# Patient Record
Sex: FEMALE | Race: WHITE | HISPANIC OR LATINO
[De-identification: names, ages, dates, MRNs, and addresses within clinical notes are randomized per-mention and may not be internally consistent; named-entity substitution may affect disease eponyms.]

---

## 2018-12-03 ENCOUNTER — ASOB RESULT (OUTPATIENT)
Age: 33
End: 2018-12-03

## 2018-12-03 ENCOUNTER — APPOINTMENT (OUTPATIENT)
Dept: ANTEPARTUM | Facility: CLINIC | Age: 33
End: 2018-12-03
Payer: SELF-PAY

## 2018-12-03 PROBLEM — Z00.00 ENCOUNTER FOR PREVENTIVE HEALTH EXAMINATION: Status: ACTIVE | Noted: 2018-12-03

## 2018-12-03 PROCEDURE — 76857 US EXAM PELVIC LIMITED: CPT

## 2018-12-03 PROCEDURE — 76830 TRANSVAGINAL US NON-OB: CPT

## 2019-03-30 ENCOUNTER — APPOINTMENT (OUTPATIENT)
Dept: ANTEPARTUM | Facility: CLINIC | Age: 34
End: 2019-03-30

## 2019-04-03 ENCOUNTER — APPOINTMENT (OUTPATIENT)
Age: 34
End: 2019-04-03
Payer: MEDICAID

## 2019-04-03 ENCOUNTER — ASOB RESULT (OUTPATIENT)
Age: 34
End: 2019-04-03

## 2019-04-03 PROCEDURE — 76801 OB US < 14 WKS SINGLE FETUS: CPT

## 2019-05-07 ENCOUNTER — LABORATORY RESULT (OUTPATIENT)
Age: 34
End: 2019-05-07

## 2019-05-08 ENCOUNTER — LABORATORY RESULT (OUTPATIENT)
Age: 34
End: 2019-05-08

## 2019-05-08 ENCOUNTER — APPOINTMENT (OUTPATIENT)
Age: 34
End: 2019-05-08

## 2019-05-08 ENCOUNTER — APPOINTMENT (OUTPATIENT)
Dept: MATERNAL FETAL MEDICINE | Facility: CLINIC | Age: 34
End: 2019-05-08
Payer: MEDICAID

## 2019-05-08 VITALS
HEIGHT: 65 IN | DIASTOLIC BLOOD PRESSURE: 66 MMHG | RESPIRATION RATE: 18 BRPM | WEIGHT: 249.44 LBS | OXYGEN SATURATION: 97 % | BODY MASS INDEX: 41.56 KG/M2 | HEART RATE: 95 BPM | SYSTOLIC BLOOD PRESSURE: 102 MMHG

## 2019-05-08 DIAGNOSIS — Z83.3 FAMILY HISTORY OF DIABETES MELLITUS: ICD-10-CM

## 2019-05-08 DIAGNOSIS — K80.20 CALCULUS OF GALLBLADDER W/OUT CHOLECYSTITIS W/OUT OBSTRUCTION: ICD-10-CM

## 2019-05-08 DIAGNOSIS — Z86.19 PERSONAL HISTORY OF OTHER INFECTIOUS AND PARASITIC DISEASES: ICD-10-CM

## 2019-05-08 DIAGNOSIS — N83.291 OTHER OVARIAN CYST, RIGHT SIDE: ICD-10-CM

## 2019-05-08 PROCEDURE — 99205 OFFICE O/P NEW HI 60 MIN: CPT

## 2019-05-08 RX ORDER — VITAMIN C, CALCIUM, IRON, VITAMIN D3, VITAMIN E, VITAMIN B1, VITAMIN B2, VITAMIN B3, VITAMIN B6, FOLIC ACID, IODINE, ZINC, COPPER, DOCUSATE SODIUM, DOCOSAHEXAENOIC ACID (DHA) 27-1-50 MG
KIT ORAL
Refills: 0 | Status: ACTIVE | COMMUNITY

## 2019-05-08 NOTE — SURGICAL HISTORY
[Abn Paps] : abnormal pap smear [Cysts] : cysts [Last Pap: ___] : Last Pap: [unfilled] [Fibroids] : no fibroids [Breast Disease] : no breast disease [Infertility] : no infertility [STI's] : no STI's [Last Mammo: ___] : Last Mammo: none [OC Use] : no OC use

## 2019-05-08 NOTE — VITALS
[LMP (date): ___] : LMP was on [unfilled] [GA =___ Weeks] : which calculates to a GA of [unfilled] weeks [GA= ___ Days] : and [unfilled] day(s) [DELORES by LMP (date): ___] : The calculated DELORES by LMP is [unfilled] [By LMP] : this is the final DELORES

## 2019-05-08 NOTE — PAST MEDICAL HISTORY
[HIV Infection] : no HIV [Chlamydial Infections] : no chlamydia [Exposure To Gonorrhea] : no gonorrhea [Syphilis] : no syphilis [Human Papilloma Virus Infection] : no genital warts [Hepatitis, B Virus] : no Hepatitis B [Herpes Simplex] : no genital herpes [Trichomoniasis] : no trichomoniasis [Hepatitis, C Virus] : no Hepatitis C

## 2019-05-08 NOTE — FAMILY HISTORY
[Age 35+ During Pregnancy] : not 35 or over during pregnancy [Reported Family History Of Birth Defects] : no congenital heart defects [Alexandre-Sachs Carrier] : no Alexandre-Sachs [Family History] : no mental retardation/autism [Reported Family History Of Genetic Disease] : no history of child defect in child of baby father

## 2019-05-08 NOTE — ACTIVE PROBLEMS
[Heart Disease] : no heart disease [Autoimmune Disease] : no autoimmune disease [Diabetes Mellitus] : no diabetes mellitus [Psychiatric Disorders] : no psychiatric disorders [Neurologic Disorder] : no neurologic disorder, no epilepsy [Renal Disease] : no kidney disease, no UTI [Hepatic Disorder] : no hepatitis, no liver disease [Depression] : no depression, no post partum depression [Thyroid Disorder] : no thyroid dysfunction [Thrombophlebitis] : no varicosities, no phlebitis [Trauma] : no trauma/violence [Blood Transfusion (___ Ml)] : no history of blood transfusion

## 2019-05-08 NOTE — OB HISTORY
[LMP: ___] : LMP: [unfilled] [EGA: ___ wks] : EGA: [unfilled] wks [DELORES: ___] : DELORES: [unfilled] [Spontaneous] : Spontaneous conception [at ___ wks] : at [unfilled] weeks [Sonogram] : sonogram [Definite:  ___ (Date)] : the last menstrual period was [unfilled] [Pregnancy History] : patient did not receive anesthesia [___] : no pregnancy complications reported [FreeTextEntry1] : She began her prenatal care at [facility] and recently changed her OB provider. Her prenatal records were not available for my review.\par \par She told me that she was diagnosed with hypertension approximately one week ago. She was started on labetalol 100 mg twice daily. \par \par She told me that she was diagnosed with anemia during early pregnancy when she was going to the [facility]. She was treated with IV iron therapy.  She is also on oral iron supplementation.\par \par

## 2019-05-08 NOTE — DISCUSSION/SUMMARY
[FreeTextEntry1] : She is 18 weeks and 5 days gestation by her last menstrual period dates.  bpm.\par \par She is overweight and obesity has been associated with a number of maternal complications such as gestational diabetes, pre-eclampsia, thrombophlebitis, labor abnormalities, post-term pregnancies,  delivery, and operative complications. Obesity has been associated with adverse fetal outcomes such as late stillbirth and  deliveries.  Obese women also have a two to three-fold increased incidence in congenital anomalies. \par \par Regarding her chronic hypertension, I told her that she is at risk for developing superimposed pre-eclampsia and fetal growth restriction. She was advised to have laboratory baseline studies such as:  CBC, platelets, PT/PTT, fibrinogen, serum creatinine, a urine protein creatinine ratio, uric acid, LDH, and CMP (liver function tests, BUN, creatinine).  I gave her laboratory referrals to have the recommended tests done as soon as possible. Her blood pressure today was 102/66. She denies having headache, epigastric pain, and visual disturbances. I told her to discontinue the antihypertensive labetalol medication because her blood pressure is  low. I told her that anti-hypertensive medication should be initiated if her systolic blood pressure is equal or greater than 155 or her diastolic blood pressure is equal or greater than 100.   She was advised to perform self blood pressure monitoring and to keep a blood pressure diary. I told her to check her blood pressure 2 or 3 times daily. I also told her to check her blood pressure it she developed a headache. I gave her a prescription for a blood pressure cuff. I also gave her a blood pressure log book page for her to make copies and record her BP readings. She was advised to bring the blood pressure diary to each prenatal visit. She was advised to call your office, or my office, or go to the hospital if her systolic blood pressure is equal or greater than 155 or her diastolic blood pressure is equal or greater than 100.  I recommend maintaining the blood pressures between 120 - 155 systolic / 80 - 100 diastolic.  I recommend daily low dose baby aspirin to decrease the risk for superimposed preeclampsia. I recommend weekly fetal surveillance with BPPs/NSTs starting at 32 - 34 weeks gestation. She can also perform daily fetal movement counts as an adjunct to the NSTs or BPPs.  I recommend serial sonograms with Doppler studies of the umbilical and uterine circulation to screen for fetal growth restriction. She was scheduled for a follow up [facility] visit in approximally 2 weeks. \par \par Regarding her anemia during pregnancy, I advised her to continue taking her prenatal vitamin and the prescribed iron supplementation. She was advised to increase her vitamin C intake and to drink orange juice without calcium because calcium can decrease iron absorption. I told her that red meat, poultry, and fish are dietary sources of iron. I told her that anemia during pregnancy increases the risk of having a  delivery or a low birth weight baby.\par \par Regarding her advanced maternal age, I informed her of the association between advanced maternal age and fetal chromosomal disorders such as Down syndrome and structural birth defects. She was told that all pregnancies have a 2 to 3% risk of having a baby born with a birth defect, and a 1 to 2 % risk of having a baby born with developmental problems that cannot be diagnosed during pregnancy. I told her that there are two types of birth defects, structural and chromosomal. She was made aware that prenatal diagnosis is available to determine whether the fetus she is carrying has normal or abnormal chromosomes,and normal or abnormal anatomy. I discussed the various screening and diagnostic tests used for prenatal diagnosis.  I explained the difference between screening and diagnostic tests.  She was offered diagnostic testing with a genetic amniocentesis.  She was made aware of the small risk of miscarriage associated with the diagnostic procedure, the limitations of the procedure, and the alternative of not having the procedures.  All of her questions were answered. She decided not to have a diagnostic procedure at this time. She decided to have screen testing with a non-invasive prenatal screen (NIPS) test. She was offered a referral for genetic counseling before the NIPS test and agree to have genetic counseling. She has been scheduled to have a detailed fetal anatomy ultrasound examination on May 16, 2019. I gave her a referral to have genetic counseling soon as possible.\par \par I told her that advanced maternal age has been associated with a higher incidence of gestational diabetes, and preeclampsia /eclampsia. I also told her that advanced maternal age has been associated with an increased risk of stillbirth, and therefore, I recommend delivery during the 39 week of gestation in the event she has not given birth by 39 weeks of gestation. \par \par

## 2019-05-09 ENCOUNTER — APPOINTMENT (OUTPATIENT)
Dept: MATERNAL FETAL MEDICINE | Facility: CLINIC | Age: 34
End: 2019-05-09
Payer: MEDICAID

## 2019-05-09 ENCOUNTER — APPOINTMENT (OUTPATIENT)
Dept: ANTEPARTUM | Facility: CLINIC | Age: 34
End: 2019-05-09

## 2019-05-09 ENCOUNTER — LABORATORY RESULT (OUTPATIENT)
Age: 34
End: 2019-05-09

## 2019-05-09 LAB
ALBUMIN SERPL ELPH-MCNC: 4 G/DL
ALP BLD-CCNC: 126 U/L
ALT SERPL-CCNC: 91 U/L
ANION GAP SERPL CALC-SCNC: 19 MMOL/L
AST SERPL-CCNC: 116 U/L
BASOPHILS # BLD AUTO: 0.03 K/UL
BASOPHILS NFR BLD AUTO: 0.2 %
BILIRUB SERPL-MCNC: 0.2 MG/DL
BUN SERPL-MCNC: 7 MG/DL
CALCIUM SERPL-MCNC: 9.7 MG/DL
CHLORIDE SERPL-SCNC: 100 MMOL/L
CO2 SERPL-SCNC: 19 MMOL/L
CREAT SERPL-MCNC: 0.34 MG/DL
CREAT SPEC-SCNC: 146 MG/DL
CREAT/PROT UR: 0.1 RATIO
EOSINOPHIL # BLD AUTO: 0.08 K/UL
EOSINOPHIL NFR BLD AUTO: 0.6 %
FIBRINOGEN PPP COAG.DERIVED-MCNC: 836 MG/DL
GLUCOSE SERPL-MCNC: 120 MG/DL
HCT VFR BLD CALC: 40.3 %
HGB BLD-MCNC: 12.7 G/DL
IMM GRANULOCYTES NFR BLD AUTO: 0.5 %
LDH SERPL-CCNC: 227 U/L
LYMPHOCYTES # BLD AUTO: 1.8 K/UL
LYMPHOCYTES NFR BLD AUTO: 13.8 %
MAN DIFF?: NORMAL
MCHC RBC-ENTMCNC: 27.5 PG
MCHC RBC-ENTMCNC: 31.5 GM/DL
MCV RBC AUTO: 87.2 FL
MONOCYTES # BLD AUTO: 0.75 K/UL
MONOCYTES NFR BLD AUTO: 5.7 %
NEUTROPHILS # BLD AUTO: 10.34 K/UL
NEUTROPHILS NFR BLD AUTO: 79.2 %
PLATELET # BLD AUTO: 389 K/UL
POTASSIUM SERPL-SCNC: 4.3 MMOL/L
PROT SERPL-MCNC: 7 G/DL
PROT UR-MCNC: 19 MG/DL
RBC # BLD: 4.62 M/UL
RBC # FLD: 15.2 %
SODIUM SERPL-SCNC: 138 MMOL/L
URATE SERPL-MCNC: 3.3 MG/DL
WBC # FLD AUTO: 13.06 K/UL

## 2019-05-09 PROCEDURE — 99203 OFFICE O/P NEW LOW 30 MIN: CPT

## 2019-05-13 LAB
2ND TRIMESTER DATA: NORMAL
AFP PNL SERPL: NORMAL
AFP SERPL-ACNC: NORMAL
CLINICAL BIOCHEMIST REVIEW: NORMAL
NOTES NTD: NORMAL

## 2019-05-16 ENCOUNTER — APPOINTMENT (OUTPATIENT)
Dept: ANTEPARTUM | Facility: CLINIC | Age: 34
End: 2019-05-16
Payer: MEDICAID

## 2019-05-16 ENCOUNTER — ASOB RESULT (OUTPATIENT)
Age: 34
End: 2019-05-16

## 2019-05-16 PROCEDURE — 76817 TRANSVAGINAL US OBSTETRIC: CPT

## 2019-05-16 PROCEDURE — 76811 OB US DETAILED SNGL FETUS: CPT

## 2019-05-22 ENCOUNTER — APPOINTMENT (OUTPATIENT)
Dept: MATERNAL FETAL MEDICINE | Facility: CLINIC | Age: 34
End: 2019-05-22
Payer: MEDICAID

## 2019-05-22 ENCOUNTER — APPOINTMENT (OUTPATIENT)
Dept: MATERNAL FETAL MEDICINE | Facility: CLINIC | Age: 34
End: 2019-05-22

## 2019-05-22 ENCOUNTER — APPOINTMENT (OUTPATIENT)
Dept: ANTEPARTUM | Facility: CLINIC | Age: 34
End: 2019-05-22
Payer: MEDICAID

## 2019-05-22 ENCOUNTER — ASOB RESULT (OUTPATIENT)
Age: 34
End: 2019-05-22

## 2019-05-22 VITALS
SYSTOLIC BLOOD PRESSURE: 108 MMHG | HEIGHT: 65 IN | WEIGHT: 250 LBS | HEART RATE: 88 BPM | RESPIRATION RATE: 18 BRPM | OXYGEN SATURATION: 97 % | BODY MASS INDEX: 41.65 KG/M2 | DIASTOLIC BLOOD PRESSURE: 62 MMHG

## 2019-05-22 VITALS
HEART RATE: 88 BPM | HEIGHT: 65 IN | BODY MASS INDEX: 41.65 KG/M2 | WEIGHT: 250 LBS | SYSTOLIC BLOOD PRESSURE: 108 MMHG | DIASTOLIC BLOOD PRESSURE: 62 MMHG

## 2019-05-22 DIAGNOSIS — O99.012 ANEMIA COMPLICATING PREGNANCY, SECOND TRIMESTER: ICD-10-CM

## 2019-05-22 PROCEDURE — 76816 OB US FOLLOW-UP PER FETUS: CPT

## 2019-05-22 PROCEDURE — 99215 OFFICE O/P EST HI 40 MIN: CPT

## 2019-05-22 NOTE — DISCUSSION/SUMMARY
[FreeTextEntry1] : She is 20 weeks and 5 days gestation by her last menstrual period dates.  bpm.\par \par Regarding her anemia during pregnancy, the hemoglobin and hematocrit done on May 8, 2019 were within normal limits (12.7/40.3). She was again advised to continue taking her prenatal vitamin and the prescribed iron supplementation. \par \par Regarding her advanced maternal age, she had a genetic counseling session with Dr. Sheppard on May 9, 2019. She elected to have a maternal serum alpha-fetoprotein level which was reported to be within normal limits; consistent with a low risk for fetal open neural tube defects. She also elected to have in noninvasive prenatal screen test with Microdeletions which reported a low risk for fetal chromosomal abnormalities and Microdeletions. She had fragile X screen testing and was found to be negative for the fragile X expansion mutation by PCR. She also had spinal muscular atrophy screening. She was found to have a reduced risk for being a carrier of spinal muscular atrophy. \par \par Regarding her chronic hypertension, she is no longer taking the labetalol antihypertensive medication. She is taking a daily baby aspirin to decrease her risk of having preeclampsia. I reviewed the laboratory baseline studies I ordered (CBC, platelets, PT/PTT, fibrinogen, serum creatinine, a urine protein creatinine ratio, uric acid, LDH, liver function tests, BUN and creatinine).  The liver function tests were elevated. She denies having liver disease. All the other laboratory tests results were within normal limits for pregnancy. Her blood pressure today was 108/62. She denies having headache, epigastric pain, and visual disturbances.  I told her that anti-hypertensive medication should be initiated if her systolic blood pressure is equal or greater than 150 or her diastolic blood pressure is equal or greater than 100.   She has not been doing self blood pressure monitoring because her medical insurance did not pay for the blood pressure machine. She was advised to call your office, or go to the hospital if developed headache, epigastric pain, or visual disturbances. I recommend repeating the liver function studies.\par \par I recommend weekly fetal surveillance with BPPs/NSTs starting at 32 - 34 weeks gestation. She can also perform daily fetal movement counts as an adjunct to the NSTs or BPPs.  I recommend serial sonograms with Doppler studies of the umbilical and uterine circulation to screen for fetal growth restriction. She was scheduled for a follow up [facility] visit in approximally 8 weeks. \par \par

## 2019-05-22 NOTE — SURGICAL HISTORY
[Abn Paps] : abnormal pap smear [Cysts] : cysts [Last Pap: ___] : Last Pap: [unfilled] [Fibroids] : no fibroids [Breast Disease] : no breast disease [STI's] : no STI's [Infertility] : no infertility [OC Use] : no OC use [Last Mammo: ___] : Last Mammo: none

## 2019-05-22 NOTE — OB HISTORY
[LMP: ___] : LMP: [unfilled] [DELORES: ___] : DELORES: [unfilled] [Spontaneous] : Spontaneous conception [Sonogram] : sonogram [at ___ wks] : at [unfilled] weeks [Definite:  ___ (Date)] : the last menstrual period was [unfilled] [Pregnancy History] : patient did not receive anesthesia [___] : no pregnancy complications reported [EGA: ___ wks] : EGA: [unfilled] wks [FreeTextEntry1] : She began her prenatal care at [facility] and recently changed her OB provider. Her prenatal records were not available for my review.\par \par She told me that she was diagnosed with hypertension approximately one week ago. She was started on labetalol 100 mg twice daily. \par \par She told me that she was diagnosed with anemia during early pregnancy when she was going to the [facility]. She was treated with IV iron therapy.  She is also on oral iron supplementation.\par \par

## 2019-05-22 NOTE — VITALS
[LMP (date): ___] : LMP was on [unfilled] [GA= ___ Days] : and [unfilled] day(s) [DELORES by LMP (date): ___] : The calculated DELORES by LMP is [unfilled] [By LMP] : this is the final DELORES [GA =___ Weeks] : which calculates to a GA of [unfilled] weeks

## 2019-05-22 NOTE — ACTIVE PROBLEMS
[Diabetes Mellitus] : no diabetes mellitus [Heart Disease] : no heart disease [Autoimmune Disease] : no autoimmune disease [Renal Disease] : no kidney disease, no UTI [Neurologic Disorder] : no neurologic disorder, no epilepsy [Psychiatric Disorders] : no psychiatric disorders [Depression] : no depression, no post partum depression [Hepatic Disorder] : no hepatitis, no liver disease [Thrombophlebitis] : no varicosities, no phlebitis [Thyroid Disorder] : no thyroid dysfunction [Trauma] : no trauma/violence [Blood Transfusion (___ Ml)] : no history of blood transfusion

## 2019-06-11 ENCOUNTER — TRANSCRIPTION ENCOUNTER (OUTPATIENT)
Age: 34
End: 2019-06-11

## 2019-06-19 ENCOUNTER — APPOINTMENT (OUTPATIENT)
Dept: MATERNAL FETAL MEDICINE | Facility: CLINIC | Age: 34
End: 2019-06-19
Payer: MEDICAID

## 2019-06-19 ENCOUNTER — APPOINTMENT (OUTPATIENT)
Dept: ANTEPARTUM | Facility: CLINIC | Age: 34
End: 2019-06-19
Payer: MEDICAID

## 2019-06-19 ENCOUNTER — ASOB RESULT (OUTPATIENT)
Age: 34
End: 2019-06-19

## 2019-06-19 VITALS
SYSTOLIC BLOOD PRESSURE: 100 MMHG | BODY MASS INDEX: 42.34 KG/M2 | RESPIRATION RATE: 16 BRPM | WEIGHT: 254.13 LBS | HEIGHT: 65 IN | OXYGEN SATURATION: 98 % | HEART RATE: 95 BPM | DIASTOLIC BLOOD PRESSURE: 60 MMHG

## 2019-06-19 PROCEDURE — 76816 OB US FOLLOW-UP PER FETUS: CPT

## 2019-06-19 PROCEDURE — 99214 OFFICE O/P EST MOD 30 MIN: CPT

## 2019-06-19 RX ORDER — LABETALOL HYDROCHLORIDE 100 MG/1
100 TABLET, FILM COATED ORAL
Refills: 0 | Status: DISCONTINUED | COMMUNITY
End: 2019-06-19

## 2019-06-19 RX ORDER — CHLORHEXIDINE GLUCONATE 4 %
325 (65 FE) LIQUID (ML) TOPICAL
Refills: 0 | Status: ACTIVE | COMMUNITY

## 2019-06-19 NOTE — SURGICAL HISTORY
[Abn Paps] : abnormal pap smear [Last Pap: ___] : Last Pap: [unfilled] [Cysts] : cysts [Breast Disease] : no breast disease [Fibroids] : no fibroids [STI's] : no STI's [Infertility] : no infertility [OC Use] : no OC use [Last Mammo: ___] : Last Mammo: none

## 2019-06-19 NOTE — PAST MEDICAL HISTORY
[HIV Infection] : no HIV [Chlamydial Infections] : no chlamydia [Exposure To Gonorrhea] : no gonorrhea [Human Papilloma Virus Infection] : no genital warts [Syphilis] : no syphilis [Herpes Simplex] : no genital herpes [Hepatitis, B Virus] : no Hepatitis B [Trichomoniasis] : no trichomoniasis [Hepatitis, C Virus] : no Hepatitis C

## 2019-06-19 NOTE — DISCUSSION/SUMMARY
[FreeTextEntry1] : She is 24 weeks and 5 days gestation by her last menstrual period dates. \par \par She is being referred today due to persistently elevated liver function tests. I noted elevated liver function tests on the laboratory tests I ordered on May 9, 2019.  She has been seen a hematologist for anemia during pregnancy. liver function tests done on April 23, 2019 at 16 weeks gestation were also elevated. The ALT was 67 and AST was 91. Repeat liver function test done by the hematologist on Enedelia 3, 2019 (22 weeks) reported the ALT to be 61 and AST to be 108. On May 9, 2019 (19 weeks) the AST was 116 and the ALT was 91. She denies having been diagnosed with liver disease before pregnancy.  Her elevated liver function tests are not due to preeclampsia since her blood pressure has always been within normal limits and all the other baseline laboratory tests done for hypertension were within normal limits.  I believe that she could have autoimmune hepatitis or primary biliary cholangitis. I recommend referring her to a hepatologist for evaluation of elevated liver function tests. I gave her a referral for her to see a hepatologist or GI specialist.\par \par Regarding her chronic hypertension, she is not taking the labetalol antihypertensive medication. She continues to take a daily baby aspirin to decrease her risk of having preeclampsia. Her blood pressure today was 100/60. She denies having headache, epigastric pain, and visual disturbances.  I told her that anti-hypertensive medication should be initiated if her systolic blood pressure is equal or greater than 150 or her diastolic blood pressure is equal or greater than 100.  She has not been doing self blood pressure monitoring because her medical insurance did not pay for the blood pressure machine. She was advised to call your office, or go to the hospital if developed headache, epigastric pain, or visual disturbances.\par \par She had an ultrasound examination done today. The estimated fetal weight was 1 lb. 12 oz. which is at the 66% for gestational age. Amniotic fluid volume was normal. She was schedule to return in 4 weeks for followup ultrasound examination. I recommend weekly fetal surveillance with BPPs/NSTs starting at 32 - 34 weeks gestation. She can also perform daily fetal movement counts as an adjunct to the NSTs or BPPs.  \par

## 2019-06-19 NOTE — OB HISTORY
[LMP: ___] : LMP: [unfilled] [DELORES: ___] : DELORES: [unfilled] [EGA: ___ wks] : EGA: [unfilled] wks [at ___ wks] : at [unfilled] weeks [Spontaneous] : Spontaneous conception [Sonogram] : sonogram [Definite:  ___ (Date)] : the last menstrual period was [unfilled] [Pregnancy History] : patient did not receive anesthesia [FreeTextEntry1] : She began her prenatal care at [facility] and recently changed her OB provider. Her prenatal records were not available for my review.\par \par She told me that she was diagnosed with hypertension approximately one week ago. She was started on labetalol 100 mg twice daily. \par \par She told me that she was diagnosed with anemia during early pregnancy when she was going to the [facility]. She was treated with IV iron therapy.  She is also on oral iron supplementation.\par \par  [___] : no pregnancy complications reported

## 2019-06-19 NOTE — FAMILY HISTORY
[Age 35+ During Pregnancy] : not 35 or over during pregnancy [Reported Family History Of Birth Defects] : no neural tube defect [Alexandre-Sachs Carrier] : no Alexandre-Sachs [Family History] : no mental retardation/autism [Reported Family History Of Genetic Disease] : no history of child defect in child of baby father

## 2019-06-19 NOTE — VITALS
[LMP (date): ___] : LMP was on [unfilled] [GA= ___ Days] : and [unfilled] day(s) [GA =___ Weeks] : which calculates to a GA of [unfilled] weeks [DELORES by LMP (date): ___] : The calculated DELORES by LMP is [unfilled] [By LMP] : this is the final DELORES

## 2019-06-19 NOTE — ACTIVE PROBLEMS
[Diabetes Mellitus] : no diabetes mellitus [Autoimmune Disease] : no autoimmune disease [Renal Disease] : no kidney disease, no UTI [Heart Disease] : no heart disease [Neurologic Disorder] : no neurologic disorder, no epilepsy [Depression] : no depression, no post partum depression [Psychiatric Disorders] : no psychiatric disorders [Hepatic Disorder] : no hepatitis, no liver disease [Thyroid Disorder] : no thyroid dysfunction [Thrombophlebitis] : no varicosities, no phlebitis [Blood Transfusion (___ Ml)] : no history of blood transfusion [Trauma] : no trauma/violence

## 2019-07-17 ENCOUNTER — APPOINTMENT (OUTPATIENT)
Dept: ANTEPARTUM | Facility: CLINIC | Age: 34
End: 2019-07-17
Payer: MEDICAID

## 2019-07-17 ENCOUNTER — ASOB RESULT (OUTPATIENT)
Age: 34
End: 2019-07-17

## 2019-07-17 PROCEDURE — 76816 OB US FOLLOW-UP PER FETUS: CPT

## 2019-07-22 ENCOUNTER — APPOINTMENT (OUTPATIENT)
Dept: HEPATOLOGY | Facility: CLINIC | Age: 34
End: 2019-07-22
Payer: MEDICAID

## 2019-07-22 VITALS
HEART RATE: 104 BPM | RESPIRATION RATE: 16 BRPM | DIASTOLIC BLOOD PRESSURE: 70 MMHG | BODY MASS INDEX: 41.65 KG/M2 | WEIGHT: 250 LBS | TEMPERATURE: 98.1 F | HEIGHT: 65 IN | SYSTOLIC BLOOD PRESSURE: 123 MMHG

## 2019-07-22 DIAGNOSIS — Z34.90 ENCOUNTER FOR SUPERVISION OF NORMAL PREGNANCY, UNSPECIFIED, UNSPECIFIED TRIMESTER: ICD-10-CM

## 2019-07-22 PROCEDURE — 99205 OFFICE O/P NEW HI 60 MIN: CPT

## 2019-07-22 NOTE — ASSESSMENT
[FreeTextEntry1] : Elevated LFTs in the setting of morbid obesity, and 29 weeks of pregnancy. No imaging study available, but suspect underlying fatty liver considering her obesity and diabetes. No evident pre-eclamsia or cholestasis of pregnancy. \par \par PLAN\par - Comprehensive evaluation for elevated LFTs\par - Schedule an US of the abdomen, and Fibroscan\par \par \par RTC in 2 weeks.

## 2019-07-22 NOTE — HISTORY OF PRESENT ILLNESS
[FreeTextEntry1] : 34 year old young female with no significant past medical hx other than morbid obesity, currently 29 weeks pregnancy. She was noted to have elevated LFTs in about 16 weeks ( May 2019). She is being followed by Dr. Jovany Webster ( Ob/Gyn)\par Asymptomatic otherwise.\par Recent diagnosis of Diabetes- not on therapy.\par Patient reports feeling well.\par No edema.\par No known HTN.\par No significant itching (mild on the back)\par \par Only medication is pre- vitamins.\par \par She denies any alcohol or illicit drug use.

## 2019-07-22 NOTE — PHYSICAL EXAM
[General Appearance - Alert] : alert [Extraocular Movements] : extraocular movements were intact [Sclera] : the sclera and conjunctiva were normal [Neck Appearance] : the appearance of the neck was normal [Outer Ear] : the ears and nose were normal in appearance [Heart Rate And Rhythm] : heart rate was normal and rhythm regular [Heart Sounds] : normal S1 and S2 [Heart Sounds Gallop] : no gallops [Murmurs] : no murmurs [Heart Sounds Pericardial Friction Rub] : no pericardial rub [Arterial Pulses Carotid] : carotid pulses were normal with no bruits [Bowel Sounds] : normal bowel sounds [Abnormal Walk] : normal gait [Abdomen Tenderness] : non-tender [] : no rash [Skin Color & Pigmentation] : normal skin color and pigmentation [Cranial Nerves] : cranial nerves 2-12 were intact [Oriented To Time, Place, And Person] : oriented to person, place, and time [Affect] : the affect was normal [Scleral Icterus] : No Scleral Icterus [Spider Angioma] : No spider angioma(s) were observed [FreeTextEntry1] : Obese

## 2019-07-23 ENCOUNTER — ASOB RESULT (OUTPATIENT)
Age: 34
End: 2019-07-23

## 2019-07-23 ENCOUNTER — APPOINTMENT (OUTPATIENT)
Dept: MATERNAL FETAL MEDICINE | Facility: CLINIC | Age: 34
End: 2019-07-23
Payer: MEDICAID

## 2019-07-23 VITALS — WEIGHT: 250.56 LBS | HEIGHT: 65 IN | BODY MASS INDEX: 41.74 KG/M2

## 2019-07-23 DIAGNOSIS — O09.522 SUPERVISION OF ELDERLY MULTIGRAVIDA, SECOND TRIMESTER: ICD-10-CM

## 2019-07-23 DIAGNOSIS — E66.01 MORBID (SEVERE) OBESITY DUE TO EXCESS CALORIES: ICD-10-CM

## 2019-07-23 PROCEDURE — G0108 DIAB MANAGE TRN  PER INDIV: CPT

## 2019-07-23 RX ORDER — URINE ACETONE TEST STRIPS
STRIP MISCELLANEOUS
Qty: 2 | Refills: 2 | Status: ACTIVE | COMMUNITY
Start: 2019-07-23 | End: 1900-01-01

## 2019-07-23 RX ORDER — LANCETS 28 GAUGE
EACH MISCELLANEOUS
Qty: 150 | Refills: 3 | Status: ACTIVE | COMMUNITY
Start: 2019-07-23 | End: 1900-01-01

## 2019-07-23 RX ORDER — BLOOD-GLUCOSE METER
KIT MISCELLANEOUS
Qty: 1 | Refills: 0 | Status: ACTIVE | COMMUNITY
Start: 2019-07-23 | End: 1900-01-01

## 2019-07-24 LAB
ESTIMATED AVERAGE GLUCOSE: 174 MG/DL
HBA1C MFR BLD HPLC: 7.7 %

## 2019-07-25 ENCOUNTER — APPOINTMENT (OUTPATIENT)
Dept: MATERNAL FETAL MEDICINE | Facility: CLINIC | Age: 34
End: 2019-07-25
Payer: MEDICAID

## 2019-07-25 ENCOUNTER — APPOINTMENT (OUTPATIENT)
Dept: ANTEPARTUM | Facility: CLINIC | Age: 34
End: 2019-07-25

## 2019-07-25 VITALS
HEART RATE: 88 BPM | SYSTOLIC BLOOD PRESSURE: 118 MMHG | HEIGHT: 65 IN | WEIGHT: 249 LBS | BODY MASS INDEX: 41.48 KG/M2 | DIASTOLIC BLOOD PRESSURE: 70 MMHG

## 2019-07-25 VITALS
HEART RATE: 88 BPM | WEIGHT: 249 LBS | SYSTOLIC BLOOD PRESSURE: 118 MMHG | HEIGHT: 65 IN | TEMPERATURE: 98.1 F | RESPIRATION RATE: 16 BRPM | BODY MASS INDEX: 41.48 KG/M2 | DIASTOLIC BLOOD PRESSURE: 70 MMHG | OXYGEN SATURATION: 98 %

## 2019-07-25 LAB
A1AT PHENOTYP SERPL-IMP: NORMAL BANDS
A1AT SERPL-MCNC: 277 MG/DL
A1AT SERPL-MCNC: 299 MG/DL
ALBUMIN SERPL ELPH-MCNC: 3.5 G/DL
ALP BLD-CCNC: 166 U/L
ALT SERPL-CCNC: 57 U/L
ANA SER IF-ACNC: NEGATIVE
ANION GAP SERPL CALC-SCNC: 13 MMOL/L
AST SERPL-CCNC: 101 U/L
BASOPHILS # BLD AUTO: 0.02 K/UL
BASOPHILS NFR BLD AUTO: 0.2 %
BILIRUB SERPL-MCNC: 0.2 MG/DL
BUN SERPL-MCNC: 5 MG/DL
CALCIUM SERPL-MCNC: 9.3 MG/DL
CERULOPLASMIN SERPL-MCNC: 66 MG/DL
CHLORIDE SERPL-SCNC: 105 MMOL/L
CO2 SERPL-SCNC: 17 MMOL/L
CREAT SERPL-MCNC: 0.4 MG/DL
EOSINOPHIL # BLD AUTO: 0.09 K/UL
EOSINOPHIL NFR BLD AUTO: 0.8 %
FERRITIN SERPL-MCNC: 52 NG/ML
GLUCOSE SERPL-MCNC: 196 MG/DL
HBV CORE IGG+IGM SER QL: NONREACTIVE
HBV SURFACE AB SER QL: NONREACTIVE
HBV SURFACE AG SER QL: NONREACTIVE
HCT VFR BLD CALC: 36.4 %
HCV AB SER QL: NONREACTIVE
HCV S/CO RATIO: 0.19 S/CO
HEPATITIS A IGG ANTIBODY: REACTIVE
HGB BLD-MCNC: 11.5 G/DL
IMM GRANULOCYTES NFR BLD AUTO: 0.5 %
INR PPP: 1.06 RATIO
IRON SATN MFR SERPL: 12 %
IRON SERPL-MCNC: 55 UG/DL
LYMPHOCYTES # BLD AUTO: 1.59 K/UL
LYMPHOCYTES NFR BLD AUTO: 14.6 %
MAN DIFF?: NORMAL
MCHC RBC-ENTMCNC: 28.4 PG
MCHC RBC-ENTMCNC: 31.6 GM/DL
MCV RBC AUTO: 89.9 FL
MITOCHONDRIA AB SER IF-ACNC: NORMAL
MONOCYTES # BLD AUTO: 0.52 K/UL
MONOCYTES NFR BLD AUTO: 4.8 %
NEUTROPHILS # BLD AUTO: 8.63 K/UL
NEUTROPHILS NFR BLD AUTO: 79.1 %
PLATELET # BLD AUTO: 346 K/UL
POTASSIUM SERPL-SCNC: 4.2 MMOL/L
PROT SERPL-MCNC: 6.5 G/DL
PT BLD: 12.1 SEC
RBC # BLD: 4.05 M/UL
RBC # FLD: 13.3 %
SMOOTH MUSCLE AB SER QL IF: NORMAL
SODIUM SERPL-SCNC: 135 MMOL/L
TIBC SERPL-MCNC: 448 UG/DL
TTG IGG SER IA-ACNC: <1.2 U/ML
TTG IGG SER IA-ACNC: NEGATIVE
UIBC SERPL-MCNC: 393 UG/DL
WBC # FLD AUTO: 10.9 K/UL

## 2019-07-25 PROCEDURE — 99214 OFFICE O/P EST MOD 30 MIN: CPT

## 2019-07-25 NOTE — OB HISTORY
[LMP: ___] : LMP: [unfilled] [DELORES: ___] : DELORES: [unfilled] [Spontaneous] : Spontaneous conception [Sonogram] : sonogram [at ___ wks] : at [unfilled] weeks [Definite:  ___ (Date)] : the last menstrual period was [unfilled] [Pregnancy History] : patient did not receive anesthesia [___] : no pregnancy complications reported [EGA: ___ wks] : EGA: [unfilled] wks [FreeTextEntry1] : She began her prenatal care at [facility] and recently changed her OB provider. Her prenatal records were not available for my review.\par \par She told me that she was diagnosed with hypertension approximately one week ago. She was started on labetalol 100 mg twice daily. \par \par She told me that she was diagnosed with anemia during early pregnancy when she was going to the [facility]. She was treated with IV iron therapy.  She is also on oral iron supplementation.\par \par

## 2019-07-25 NOTE — VITALS
[DELORES by LMP (date): ___] : The calculated DELORES by LMP is [unfilled] [By LMP] : this is the final DELORES [LMP (date): ___] : LMP was on [unfilled] [GA =___ Weeks] : which calculates to a GA of [unfilled] weeks [GA= ___ Days] : and [unfilled] day(s)

## 2019-07-25 NOTE — DISCUSSION/SUMMARY
[FreeTextEntry1] : She is 29 weeks and 6 days gestation by her last menstrual period dates. \par \par She saw the hepatologist on 7/22/19 due to persistently elevated liver function tests. She is scheduled to have an ultrasound examination of her abdomen on July 29, 2019. She had various laboratory tests ordered to help determine the etiology of the elevated liver function tests.\par \par Regarding her chronic hypertension, she is not taking antihypertensive medication. She continues to take a daily baby aspirin to decrease her risk of having preeclampsia. Her blood pressure today was 118/70. She denies having headache, epigastric pain, and visual disturbances.  I again told her that anti-hypertensive medication should be initiated if her systolic blood pressure is equal or greater than 150 or her diastolic blood pressure is equal or greater than 100.  She has not been doing self blood pressure monitoring because her medical insurance did not pay for the blood pressure machine. She was advised to call your office, or go to the hospital if developed headache, epigastric pain, or visual disturbances. She was scheduled for a followup [facility] visit and an ultrasound examination in 3 weeks.\par \par She was recently diagnosed with gestational diabetes. She was seen by me and our diabetes educator on July 23, 2019 for initial diabetes education and counseling.  Hemoglobin A1c level done that day was 7.7%. It is possible that she had diabetes before pregnancy that was not diagnosed until she became pregnant. She was started on 15 units of NPH insulin at bedtime. Her fasting glucose today was 127. She was advised to increase her NPH insulin to 18 units. I also told her that if her fasting glucose remained above 90 on 2 to 3 consecutive days she should increase her bedtime NPH insulin by 2 units. She told met that she understood that she should increase the NPH insulin by 2 unite every 2 to 3 days if the fasting glucose readings remained above 90. She is having difficulties following the recommended diabetic diet. Today, she had additional nutritional counseling. She was advised to return in one week to see the diabetes educator.

## 2019-07-26 LAB — SOLUBLE LIVER IGG SER IA-ACNC: < 20.1 UNITS

## 2019-07-31 ENCOUNTER — APPOINTMENT (OUTPATIENT)
Dept: HEPATOLOGY | Facility: CLINIC | Age: 34
End: 2019-07-31
Payer: MEDICAID

## 2019-07-31 DIAGNOSIS — R94.5 ABNORMAL RESULTS OF LIVER FUNCTION STUDIES: ICD-10-CM

## 2019-07-31 PROCEDURE — 91200 LIVER ELASTOGRAPHY: CPT

## 2019-08-01 LAB
LYSOSOMAL ACID LIPASE INTERPRETATION: NORMAL
LYSOSOMAL ACID LIPASE: 162 CD:384473389

## 2019-08-05 ENCOUNTER — ASOB RESULT (OUTPATIENT)
Age: 34
End: 2019-08-05

## 2019-08-05 ENCOUNTER — APPOINTMENT (OUTPATIENT)
Dept: MATERNAL FETAL MEDICINE | Facility: CLINIC | Age: 34
End: 2019-08-05
Payer: MEDICAID

## 2019-08-05 VITALS — HEIGHT: 65 IN | WEIGHT: 255 LBS | BODY MASS INDEX: 42.49 KG/M2

## 2019-08-05 PROCEDURE — G0108 DIAB MANAGE TRN  PER INDIV: CPT

## 2019-08-05 RX ORDER — BLOOD SUGAR DIAGNOSTIC
STRIP MISCELLANEOUS
Qty: 175 | Refills: 3 | Status: ACTIVE | COMMUNITY
Start: 2019-07-23 | End: 1900-01-01

## 2019-08-12 ENCOUNTER — APPOINTMENT (OUTPATIENT)
Dept: ANTEPARTUM | Facility: CLINIC | Age: 34
End: 2019-08-12

## 2019-08-12 ENCOUNTER — APPOINTMENT (OUTPATIENT)
Dept: MATERNAL FETAL MEDICINE | Facility: CLINIC | Age: 34
End: 2019-08-12
Payer: MEDICAID

## 2019-08-12 ENCOUNTER — ASOB RESULT (OUTPATIENT)
Age: 34
End: 2019-08-12

## 2019-08-12 VITALS — WEIGHT: 255.25 LBS | HEIGHT: 65 IN | BODY MASS INDEX: 42.53 KG/M2

## 2019-08-12 PROCEDURE — XXXXX: CPT

## 2019-08-15 PROBLEM — R94.5 ELEVATED LFTS: Status: ACTIVE | Noted: 2019-06-19

## 2019-08-19 ENCOUNTER — APPOINTMENT (OUTPATIENT)
Dept: ANTEPARTUM | Facility: CLINIC | Age: 34
End: 2019-08-19
Payer: MEDICAID

## 2019-08-19 ENCOUNTER — ASOB RESULT (OUTPATIENT)
Age: 34
End: 2019-08-19

## 2019-08-19 ENCOUNTER — APPOINTMENT (OUTPATIENT)
Dept: MATERNAL FETAL MEDICINE | Facility: CLINIC | Age: 34
End: 2019-08-19
Payer: MEDICAID

## 2019-08-19 VITALS
RESPIRATION RATE: 18 BRPM | OXYGEN SATURATION: 98 % | HEART RATE: 94 BPM | DIASTOLIC BLOOD PRESSURE: 78 MMHG | BODY MASS INDEX: 42.19 KG/M2 | HEIGHT: 65 IN | WEIGHT: 253.25 LBS | SYSTOLIC BLOOD PRESSURE: 130 MMHG

## 2019-08-19 DIAGNOSIS — O24.414 GESTATIONAL DIABETES MELLITUS IN PREGNANCY, INSULIN CONTROLLED: ICD-10-CM

## 2019-08-19 DIAGNOSIS — Z3A.33 33 WEEKS GESTATION OF PREGNANCY: ICD-10-CM

## 2019-08-19 DIAGNOSIS — O99.213 OBESITY COMPLICATING PREGNANCY, THIRD TRIMESTER: ICD-10-CM

## 2019-08-19 DIAGNOSIS — O10.919 UNSPECIFIED PRE-EXISTING HYPERTENSION COMPLICATING PREGNANCY, UNSPECIFIED TRIMESTER: ICD-10-CM

## 2019-08-19 DIAGNOSIS — Z91.19 PATIENT'S NONCOMPLIANCE WITH OTHER MEDICAL TREATMENT AND REGIMEN: ICD-10-CM

## 2019-08-19 PROCEDURE — 76820 UMBILICAL ARTERY ECHO: CPT

## 2019-08-19 PROCEDURE — 76816 OB US FOLLOW-UP PER FETUS: CPT

## 2019-08-19 PROCEDURE — 76819 FETAL BIOPHYS PROFIL W/O NST: CPT

## 2019-08-19 PROCEDURE — 99214 OFFICE O/P EST MOD 30 MIN: CPT

## 2019-08-19 PROCEDURE — 93976 VASCULAR STUDY: CPT

## 2019-08-19 RX ORDER — FERROUS SULFATE 325(65) MG
325 (65 FE) TABLET ORAL
Refills: 0 | Status: ACTIVE | COMMUNITY

## 2019-08-19 RX ORDER — INSULIN LISPRO 100 [IU]/ML
100 INJECTION, SOLUTION INTRAVENOUS; SUBCUTANEOUS
Qty: 1 | Refills: 1 | Status: DISCONTINUED | COMMUNITY
Start: 2019-08-05 | End: 2019-08-19

## 2019-08-19 NOTE — PAST MEDICAL HISTORY
[HIV Infection] : no HIV [Chlamydial Infections] : no chlamydia [Exposure To Gonorrhea] : no gonorrhea [Syphilis] : no syphilis [Herpes Simplex] : no genital herpes [Hepatitis, C Virus] : no Hepatitis C [Human Papilloma Virus Infection] : no genital warts [Hepatitis, B Virus] : no Hepatitis B [Trichomoniasis] : no trichomoniasis

## 2019-08-19 NOTE — DISCUSSION/SUMMARY
[FreeTextEntry1] : She is 33 weeks and 3 days gestation by her last menstrual period dates. \par \par She saw the hepatologist on 7/22/19 due to persistently elevated liver function tests. She states that she was told that she could not have the recommended ultrasound examination of her abdomen because she was pregnant.  She has not had a follow up visit with the hepatologist. \par \par Regarding her chronic hypertension, she is not taking antihypertensive medication. She continues to take a daily baby aspirin to decrease her risk of having preeclampsia. Her blood pressure today was 130/78. She denies having headache, epigastric pain, and visual disturbances.  I again told her that anti-hypertensive medication should be initiated if her systolic blood pressure is equal or greater than 150 or her diastolic blood pressure is equal or greater than 100.  She has not been doing self blood pressure monitoring because her medical insurance did not pay for the blood pressure machine. She was advised to call your office, or go to the hospital if developed headache, epigastric pain, or visual disturbances. She was scheduled for a followup [employer] visit and an ultrasound examination in 4 weeks.\par \par She has gestational diabetes. She was seen by me and our diabetes educator on July 23, 2019 for initial diabetes education and counseling.  Hemoglobin A1c level done 7/23/19 was 7.7%. She currently takes 36 units of NPH insulin at bedtime. Her fasting glucose have been elevated. She was advised to increase her NPH insulin to 40 units. I also told her that if her fasting glucose remained above 90 on 2 to 3 consecutive days she should increase her bedtime NPH insulin by 2 units. She told me that she understood that she should increase the NPH insulin by 2 unite every 2 to 3 days if the fasting glucose readings remained above 90. She has been taking 40 units of NPH insulin before breakfast and 10 units of Admelog  insulin before dinner. Most postprandial glucose values have been elevated. She has nor been testing her glucose at the appropriate times. She states that she usually goes out after eating and cannot test the glucose. She is also not following the recommended diabetic diet. She was advised to return in one week to see the diabetes educator. I adjusted all her insulin dosages and added 10 units of Admelog insulin before breakfast. Her current insulin dosage is 44 units NPH and 10 units at block before breakfast, 16 units at block before dinner and 40 units of NPH before bedtime.\par

## 2019-08-19 NOTE — VITALS
[LMP (date): ___] : LMP was on [unfilled] [DELORES by LMP (date): ___] : The calculated DELORES by LMP is [unfilled] [By LMP] : this is the final DELORES [GA =___ Weeks] : which calculates to a GA of [unfilled] weeks [GA= ___ Days] : and [unfilled] day(s)

## 2019-08-19 NOTE — OB HISTORY
[DELORES: ___] : DELORES: [unfilled] [LMP: ___] : LMP: [unfilled] [Sonogram] : sonogram [Spontaneous] : Spontaneous conception [at ___ wks] : at [unfilled] weeks [Definite:  ___ (Date)] : the last menstrual period was [unfilled] [EGA: ___ wks] : EGA: [unfilled] wks [Pregnancy History] : patient did not receive anesthesia [___] : no pregnancy complications reported [FreeTextEntry1] : She began her prenatal care at [facility] and recently changed her OB provider. Her prenatal records were not available for my review.\par \par She told me that she was diagnosed with hypertension approximately one week ago. She was started on labetalol 100 mg twice daily. \par \par She told me that she was diagnosed with anemia during early pregnancy when she was going to the [facility]. She was treated with IV iron therapy.  She is also on oral iron supplementation.\par \par

## 2019-08-19 NOTE — ACTIVE PROBLEMS
[Diabetes Mellitus] : no diabetes mellitus [Heart Disease] : no heart disease [Renal Disease] : no kidney disease, no UTI [Autoimmune Disease] : no autoimmune disease [Neurologic Disorder] : no neurologic disorder, no epilepsy [Hepatic Disorder] : no hepatitis, no liver disease [Depression] : no depression, no post partum depression [Psychiatric Disorders] : no psychiatric disorders [Thrombophlebitis] : no varicosities, no phlebitis [Trauma] : no trauma/violence [Thyroid Disorder] : no thyroid dysfunction [Blood Transfusion (___ Ml)] : no history of blood transfusion

## 2019-08-20 ENCOUNTER — APPOINTMENT (OUTPATIENT)
Dept: MATERNAL FETAL MEDICINE | Facility: CLINIC | Age: 34
End: 2019-08-20

## 2019-08-23 ENCOUNTER — OUTPATIENT (OUTPATIENT)
Dept: OUTPATIENT SERVICES | Facility: HOSPITAL | Age: 34
LOS: 1 days | End: 2019-08-23
Payer: MEDICAID

## 2019-08-23 VITALS — HEART RATE: 87 BPM | DIASTOLIC BLOOD PRESSURE: 86 MMHG | SYSTOLIC BLOOD PRESSURE: 135 MMHG

## 2019-08-23 VITALS — HEART RATE: 85 BPM | SYSTOLIC BLOOD PRESSURE: 130 MMHG | DIASTOLIC BLOOD PRESSURE: 82 MMHG

## 2019-08-23 DIAGNOSIS — O03.9 COMPLETE OR UNSPECIFIED SPONTANEOUS ABORTION WITHOUT COMPLICATION: Chronic | ICD-10-CM

## 2019-08-23 DIAGNOSIS — Z90.49 ACQUIRED ABSENCE OF OTHER SPECIFIED PARTS OF DIGESTIVE TRACT: Chronic | ICD-10-CM

## 2019-08-23 DIAGNOSIS — O47.03 FALSE LABOR BEFORE 37 COMPLETED WEEKS OF GESTATION, THIRD TRIMESTER: ICD-10-CM

## 2019-08-23 DIAGNOSIS — Z98.890 OTHER SPECIFIED POSTPROCEDURAL STATES: Chronic | ICD-10-CM

## 2019-08-23 LAB
ALBUMIN SERPL ELPH-MCNC: 3.3 G/DL — SIGNIFICANT CHANGE UP (ref 3.3–5.2)
ALP SERPL-CCNC: 283 U/L — HIGH (ref 40–120)
ALT FLD-CCNC: 40 U/L — HIGH
ANION GAP SERPL CALC-SCNC: 14 MMOL/L — SIGNIFICANT CHANGE UP (ref 5–17)
APPEARANCE UR: CLEAR — SIGNIFICANT CHANGE UP
AST SERPL-CCNC: 59 U/L — HIGH
BASOPHILS # BLD AUTO: 0.02 K/UL — SIGNIFICANT CHANGE UP (ref 0–0.2)
BASOPHILS NFR BLD AUTO: 0.2 % — SIGNIFICANT CHANGE UP (ref 0–2)
BILIRUB SERPL-MCNC: 0.2 MG/DL — LOW (ref 0.4–2)
BILIRUB UR-MCNC: NEGATIVE — SIGNIFICANT CHANGE UP
BUN SERPL-MCNC: 6 MG/DL — LOW (ref 8–20)
CALCIUM SERPL-MCNC: 9.4 MG/DL — SIGNIFICANT CHANGE UP (ref 8.6–10.2)
CHLORIDE SERPL-SCNC: 102 MMOL/L — SIGNIFICANT CHANGE UP (ref 98–107)
CO2 SERPL-SCNC: 20 MMOL/L — LOW (ref 22–29)
COLOR SPEC: YELLOW — SIGNIFICANT CHANGE UP
CREAT ?TM UR-MCNC: 37 MG/DL — SIGNIFICANT CHANGE UP
CREAT SERPL-MCNC: 0.44 MG/DL — LOW (ref 0.5–1.3)
DIFF PNL FLD: NEGATIVE — SIGNIFICANT CHANGE UP
EOSINOPHIL # BLD AUTO: 0.06 K/UL — SIGNIFICANT CHANGE UP (ref 0–0.5)
EOSINOPHIL NFR BLD AUTO: 0.5 % — SIGNIFICANT CHANGE UP (ref 0–6)
GLUCOSE BLDC GLUCOMTR-MCNC: 77 MG/DL — SIGNIFICANT CHANGE UP (ref 70–99)
GLUCOSE SERPL-MCNC: 76 MG/DL — SIGNIFICANT CHANGE UP (ref 70–115)
GLUCOSE UR QL: NEGATIVE MG/DL — SIGNIFICANT CHANGE UP
HCT VFR BLD CALC: 38 % — SIGNIFICANT CHANGE UP (ref 34.5–45)
HGB BLD-MCNC: 12.1 G/DL — SIGNIFICANT CHANGE UP (ref 11.5–15.5)
IMM GRANULOCYTES NFR BLD AUTO: 0.4 % — SIGNIFICANT CHANGE UP (ref 0–1.5)
KETONES UR-MCNC: ABNORMAL
LEUKOCYTE ESTERASE UR-ACNC: NEGATIVE — SIGNIFICANT CHANGE UP
LYMPHOCYTES # BLD AUTO: 18.1 % — SIGNIFICANT CHANGE UP (ref 13–44)
LYMPHOCYTES # BLD AUTO: 2 K/UL — SIGNIFICANT CHANGE UP (ref 1–3.3)
MCHC RBC-ENTMCNC: 27.2 PG — SIGNIFICANT CHANGE UP (ref 27–34)
MCHC RBC-ENTMCNC: 31.8 GM/DL — LOW (ref 32–36)
MCV RBC AUTO: 85.4 FL — SIGNIFICANT CHANGE UP (ref 80–100)
MONOCYTES # BLD AUTO: 0.53 K/UL — SIGNIFICANT CHANGE UP (ref 0–0.9)
MONOCYTES NFR BLD AUTO: 4.8 % — SIGNIFICANT CHANGE UP (ref 2–14)
NEUTROPHILS # BLD AUTO: 8.43 K/UL — HIGH (ref 1.8–7.4)
NEUTROPHILS NFR BLD AUTO: 76 % — SIGNIFICANT CHANGE UP (ref 43–77)
NITRITE UR-MCNC: NEGATIVE — SIGNIFICANT CHANGE UP
PH UR: 7 — SIGNIFICANT CHANGE UP (ref 5–8)
PLATELET # BLD AUTO: 381 K/UL — SIGNIFICANT CHANGE UP (ref 150–400)
POTASSIUM SERPL-MCNC: 4 MMOL/L — SIGNIFICANT CHANGE UP (ref 3.5–5.3)
POTASSIUM SERPL-SCNC: 4 MMOL/L — SIGNIFICANT CHANGE UP (ref 3.5–5.3)
PROT ?TM UR-MCNC: 10 MG/DL — SIGNIFICANT CHANGE UP (ref 0–12)
PROT SERPL-MCNC: 7.4 G/DL — SIGNIFICANT CHANGE UP (ref 6.6–8.7)
PROT UR-MCNC: NEGATIVE MG/DL — SIGNIFICANT CHANGE UP
PROT/CREAT UR-RTO: 0.3 RATIO — HIGH
RBC # BLD: 4.45 M/UL — SIGNIFICANT CHANGE UP (ref 3.8–5.2)
RBC # FLD: 12.6 % — SIGNIFICANT CHANGE UP (ref 10.3–14.5)
SODIUM SERPL-SCNC: 136 MMOL/L — SIGNIFICANT CHANGE UP (ref 135–145)
SP GR SPEC: 1 — LOW (ref 1.01–1.02)
UROBILINOGEN FLD QL: NEGATIVE MG/DL — SIGNIFICANT CHANGE UP
WBC # BLD: 11.08 K/UL — HIGH (ref 3.8–10.5)
WBC # FLD AUTO: 11.08 K/UL — HIGH (ref 3.8–10.5)

## 2019-08-23 PROCEDURE — 80053 COMPREHEN METABOLIC PANEL: CPT

## 2019-08-23 PROCEDURE — 81003 URINALYSIS AUTO W/O SCOPE: CPT

## 2019-08-23 PROCEDURE — 82962 GLUCOSE BLOOD TEST: CPT

## 2019-08-23 PROCEDURE — 59025 FETAL NON-STRESS TEST: CPT

## 2019-08-23 PROCEDURE — 82570 ASSAY OF URINE CREATININE: CPT

## 2019-08-23 PROCEDURE — 85027 COMPLETE CBC AUTOMATED: CPT

## 2019-08-23 PROCEDURE — G0463: CPT

## 2019-08-23 PROCEDURE — 84156 ASSAY OF PROTEIN URINE: CPT

## 2019-08-23 PROCEDURE — 36415 COLL VENOUS BLD VENIPUNCTURE: CPT

## 2019-08-23 RX ORDER — SODIUM CHLORIDE 9 MG/ML
1000 INJECTION, SOLUTION INTRAVENOUS
Refills: 0 | Status: DISCONTINUED | OUTPATIENT
Start: 2019-08-23 | End: 2019-09-14

## 2019-08-23 NOTE — OB PROVIDER TRIAGE NOTE - NSHPPHYSICALEXAM_GEN_ALL_CORE
PHYSICAL EXAM  Vital Signs Last 24 Hrs  T(C): 36.6 (23 Aug 2019 15:54), Max: 36.6 (23 Aug 2019 15:54)  T(F): 97.9 (23 Aug 2019 15:54), Max: 97.9 (23 Aug 2019 15:54)  HR: 87 (23 Aug 2019 15:54) (87 - 87)  BP: 135/86 (23 Aug 2019 15:54) (135/86 - 135/86)  RR: 14 (23 Aug 2019 15:54) (14 - 14)    FHR: Category I  [place]: q3-4m  SVE: PHYSICAL EXAM  Vital Signs Last 24 Hrs  T(C): 36.6 (23 Aug 2019 15:54), Max: 36.6 (23 Aug 2019 15:54)  T(F): 97.9 (23 Aug 2019 15:54), Max: 97.9 (23 Aug 2019 15:54)  HR: 87 (23 Aug 2019 15:54) (87 - 87)  BP: 135/86 (23 Aug 2019 15:54) (135/86 - 135/86)  RR: 14 (23 Aug 2019 15:54) (14 - 14)    FHR: Category I  [place]: q3-4m  SVE: 1/50/-3

## 2019-08-23 NOTE — OB PROVIDER TRIAGE NOTE - NSOBPROVIDERNOTE_OBGYN_ALL_OB_FT
MADYJACLYN ROMEOR is a 34yF  @ 34w0d here for evaluation after episode of hypoglycemia. Of note, patient's blood pressure was slightly elevated but she is asymptomatic.   - NST  - CBC  - CMP  - UA  - Accucheck  - MFM Consult    d/w Dr. Yun

## 2019-08-23 NOTE — OB PROVIDER TRIAGE NOTE - HISTORY OF PRESENT ILLNESS
JACLYN BANUELOS is a 34yF  @ 34w0d sent from the office after an abnormally low glucose level was seen on fingerstick today.   Patient states that when she woke up she felt her blood sugar was low. When she tested it, it was 50. She then went to the office where her blood glucose was 69.   Denies contractions, vaginal bleeding, or loss of fluid. Endorse to fetal movement. Denies headache, blurry vision, RUQ pain, CP, or SOB.   Pregnancy complicated by GDMA2 on Insulin (40U long-acting in AM and PM, 10U before breakfast and 16U before dinner of short-acting, patient unable to recall names).    ObHx:   1) () - SAB 12 wks - D&C  2) () - Term  8lbs without complication  PMH: Iron deficiency anemia  PSH: Cholecystectomy (), D&C (), Colposcopy  Past GYN: No fibroids, cysts, STI's or abnormal PAP. Last pap 2017  Meds: Insulin, Iron, PNVs  All: NKDA

## 2019-08-26 ENCOUNTER — APPOINTMENT (OUTPATIENT)
Dept: ANTEPARTUM | Facility: CLINIC | Age: 34
End: 2019-08-26
Payer: MEDICAID

## 2019-08-26 ENCOUNTER — ASOB RESULT (OUTPATIENT)
Age: 34
End: 2019-08-26

## 2019-08-26 PROCEDURE — 76818 FETAL BIOPHYS PROFILE W/NST: CPT

## 2019-08-26 PROCEDURE — 99214 OFFICE O/P EST MOD 30 MIN: CPT

## 2019-08-26 PROCEDURE — 76820 UMBILICAL ARTERY ECHO: CPT

## 2019-08-26 PROCEDURE — 93976 VASCULAR STUDY: CPT

## 2019-08-29 PROBLEM — I10 ESSENTIAL (PRIMARY) HYPERTENSION: Chronic | Status: ACTIVE | Noted: 2019-08-23

## 2019-09-01 ENCOUNTER — OUTPATIENT (OUTPATIENT)
Dept: OUTPATIENT SERVICES | Facility: HOSPITAL | Age: 34
LOS: 1 days | End: 2019-09-01
Payer: MEDICAID

## 2019-09-01 DIAGNOSIS — Z98.890 OTHER SPECIFIED POSTPROCEDURAL STATES: Chronic | ICD-10-CM

## 2019-09-01 DIAGNOSIS — Z90.49 ACQUIRED ABSENCE OF OTHER SPECIFIED PARTS OF DIGESTIVE TRACT: Chronic | ICD-10-CM

## 2019-09-01 DIAGNOSIS — O03.9 COMPLETE OR UNSPECIFIED SPONTANEOUS ABORTION WITHOUT COMPLICATION: Chronic | ICD-10-CM

## 2019-09-01 PROCEDURE — G9001: CPT

## 2019-09-03 ENCOUNTER — APPOINTMENT (OUTPATIENT)
Dept: ANTEPARTUM | Facility: CLINIC | Age: 34
End: 2019-09-03
Payer: MEDICAID

## 2019-09-03 ENCOUNTER — ASOB RESULT (OUTPATIENT)
Age: 34
End: 2019-09-03

## 2019-09-03 PROCEDURE — 76820 UMBILICAL ARTERY ECHO: CPT

## 2019-09-03 PROCEDURE — 76818 FETAL BIOPHYS PROFILE W/NST: CPT

## 2019-09-03 PROCEDURE — 93976 VASCULAR STUDY: CPT

## 2019-09-03 PROCEDURE — 76821 MIDDLE CEREBRAL ARTERY ECHO: CPT

## 2019-09-04 ENCOUNTER — APPOINTMENT (OUTPATIENT)
Dept: MATERNAL FETAL MEDICINE | Facility: CLINIC | Age: 34
End: 2019-09-04
Payer: MEDICAID

## 2019-09-04 ENCOUNTER — ASOB RESULT (OUTPATIENT)
Age: 34
End: 2019-09-04

## 2019-09-04 VITALS — HEIGHT: 65 IN | WEIGHT: 257.5 LBS | BODY MASS INDEX: 42.9 KG/M2

## 2019-09-04 PROCEDURE — 95250 CONT GLUC MNTR PHYS/QHP EQP: CPT

## 2019-09-04 PROCEDURE — 95251 CONT GLUC MNTR ANALYSIS I&R: CPT

## 2019-09-04 PROCEDURE — G0108 DIAB MANAGE TRN  PER INDIV: CPT

## 2019-09-04 RX ORDER — INSULIN LISPRO 100 U/ML
100 INJECTION, SOLUTION INTRAVENOUS; SUBCUTANEOUS
Qty: 1 | Refills: 1 | Status: ACTIVE | COMMUNITY
Start: 2019-08-07 | End: 1900-01-01

## 2019-09-04 RX ORDER — INSULIN HUMAN 100 [IU]/ML
100 INJECTION, SUSPENSION SUBCUTANEOUS
Qty: 1 | Refills: 1 | Status: ACTIVE | COMMUNITY
Start: 2019-07-23 | End: 1900-01-01

## 2019-09-04 RX ORDER — SYRINGE AND NEEDLE,INSULIN,1ML 31 GX5/16"
31G X 5/16" SYRINGE, EMPTY DISPOSABLE MISCELLANEOUS
Qty: 1 | Refills: 1 | Status: ACTIVE | COMMUNITY
Start: 2019-07-23 | End: 1900-01-01

## 2019-09-06 ENCOUNTER — OUTPATIENT (OUTPATIENT)
Dept: OUTPATIENT SERVICES | Facility: HOSPITAL | Age: 34
LOS: 1 days | End: 2019-09-06
Payer: MEDICAID

## 2019-09-06 VITALS — DIASTOLIC BLOOD PRESSURE: 67 MMHG | SYSTOLIC BLOOD PRESSURE: 133 MMHG | HEART RATE: 88 BPM

## 2019-09-06 VITALS — DIASTOLIC BLOOD PRESSURE: 78 MMHG | SYSTOLIC BLOOD PRESSURE: 145 MMHG | HEART RATE: 90 BPM

## 2019-09-06 DIAGNOSIS — O47.03 FALSE LABOR BEFORE 37 COMPLETED WEEKS OF GESTATION, THIRD TRIMESTER: ICD-10-CM

## 2019-09-06 DIAGNOSIS — O03.9 COMPLETE OR UNSPECIFIED SPONTANEOUS ABORTION WITHOUT COMPLICATION: Chronic | ICD-10-CM

## 2019-09-06 DIAGNOSIS — Z98.890 OTHER SPECIFIED POSTPROCEDURAL STATES: Chronic | ICD-10-CM

## 2019-09-06 DIAGNOSIS — Z90.49 ACQUIRED ABSENCE OF OTHER SPECIFIED PARTS OF DIGESTIVE TRACT: Chronic | ICD-10-CM

## 2019-09-06 LAB
ALBUMIN SERPL ELPH-MCNC: 2.8 G/DL — LOW (ref 3.3–5.2)
ALP SERPL-CCNC: 270 U/L — HIGH (ref 40–120)
ALT FLD-CCNC: 20 U/L — SIGNIFICANT CHANGE UP
ANION GAP SERPL CALC-SCNC: 15 MMOL/L — SIGNIFICANT CHANGE UP (ref 5–17)
APPEARANCE UR: CLEAR — SIGNIFICANT CHANGE UP
APTT BLD: 41 SEC — HIGH (ref 27.5–36.3)
AST SERPL-CCNC: 35 U/L — HIGH
BACTERIA # UR AUTO: NEGATIVE — SIGNIFICANT CHANGE UP
BASOPHILS # BLD AUTO: 0.01 K/UL — SIGNIFICANT CHANGE UP (ref 0–0.2)
BASOPHILS NFR BLD AUTO: 0.1 % — SIGNIFICANT CHANGE UP (ref 0–2)
BILIRUB SERPL-MCNC: <0.2 MG/DL — LOW (ref 0.4–2)
BILIRUB UR-MCNC: NEGATIVE — SIGNIFICANT CHANGE UP
BUN SERPL-MCNC: 10 MG/DL — SIGNIFICANT CHANGE UP (ref 8–20)
CALCIUM SERPL-MCNC: 8.7 MG/DL — SIGNIFICANT CHANGE UP (ref 8.6–10.2)
CHLORIDE SERPL-SCNC: 105 MMOL/L — SIGNIFICANT CHANGE UP (ref 98–107)
CO2 SERPL-SCNC: 18 MMOL/L — LOW (ref 22–29)
COLOR SPEC: YELLOW — SIGNIFICANT CHANGE UP
CREAT ?TM UR-MCNC: 197 MG/DL — SIGNIFICANT CHANGE UP
CREAT SERPL-MCNC: 0.57 MG/DL — SIGNIFICANT CHANGE UP (ref 0.5–1.3)
DIFF PNL FLD: ABNORMAL
EOSINOPHIL # BLD AUTO: 0.06 K/UL — SIGNIFICANT CHANGE UP (ref 0–0.5)
EOSINOPHIL NFR BLD AUTO: 0.6 % — SIGNIFICANT CHANGE UP (ref 0–6)
EPI CELLS # UR: SIGNIFICANT CHANGE UP
GLUCOSE SERPL-MCNC: 132 MG/DL — HIGH (ref 70–115)
GLUCOSE UR QL: NEGATIVE MG/DL — SIGNIFICANT CHANGE UP
HCT VFR BLD CALC: 33.4 % — LOW (ref 34.5–45)
HGB BLD-MCNC: 11 G/DL — LOW (ref 11.5–15.5)
HIV 1 & 2 AB SERPL IA.RAPID: SIGNIFICANT CHANGE UP
IMM GRANULOCYTES NFR BLD AUTO: 0.6 % — SIGNIFICANT CHANGE UP (ref 0–1.5)
INR BLD: 1.23 RATIO — HIGH (ref 0.88–1.16)
KETONES UR-MCNC: NEGATIVE — SIGNIFICANT CHANGE UP
LEUKOCYTE ESTERASE UR-ACNC: ABNORMAL
LYMPHOCYTES # BLD AUTO: 1.8 K/UL — SIGNIFICANT CHANGE UP (ref 1–3.3)
LYMPHOCYTES # BLD AUTO: 19.1 % — SIGNIFICANT CHANGE UP (ref 13–44)
MCHC RBC-ENTMCNC: 27.2 PG — SIGNIFICANT CHANGE UP (ref 27–34)
MCHC RBC-ENTMCNC: 32.9 GM/DL — SIGNIFICANT CHANGE UP (ref 32–36)
MCV RBC AUTO: 82.5 FL — SIGNIFICANT CHANGE UP (ref 80–100)
MONOCYTES # BLD AUTO: 0.64 K/UL — SIGNIFICANT CHANGE UP (ref 0–0.9)
MONOCYTES NFR BLD AUTO: 6.8 % — SIGNIFICANT CHANGE UP (ref 2–14)
NEUTROPHILS # BLD AUTO: 6.87 K/UL — SIGNIFICANT CHANGE UP (ref 1.8–7.4)
NEUTROPHILS NFR BLD AUTO: 72.8 % — SIGNIFICANT CHANGE UP (ref 43–77)
NITRITE UR-MCNC: NEGATIVE — SIGNIFICANT CHANGE UP
PH UR: 6 — SIGNIFICANT CHANGE UP (ref 5–8)
PLATELET # BLD AUTO: 340 K/UL — SIGNIFICANT CHANGE UP (ref 150–400)
POTASSIUM SERPL-MCNC: 4.4 MMOL/L — SIGNIFICANT CHANGE UP (ref 3.5–5.3)
POTASSIUM SERPL-SCNC: 4.4 MMOL/L — SIGNIFICANT CHANGE UP (ref 3.5–5.3)
PROT ?TM UR-MCNC: 56 MG/DL — HIGH (ref 0–12)
PROT SERPL-MCNC: 6.8 G/DL — SIGNIFICANT CHANGE UP (ref 6.6–8.7)
PROT UR-MCNC: 100 MG/DL
PROT/CREAT UR-RTO: 0.3 RATIO — HIGH
PROTHROM AB SERPL-ACNC: 14.2 SEC — HIGH (ref 10–12.9)
RBC # BLD: 4.05 M/UL — SIGNIFICANT CHANGE UP (ref 3.8–5.2)
RBC # FLD: 12.9 % — SIGNIFICANT CHANGE UP (ref 10.3–14.5)
RBC CASTS # UR COMP ASSIST: SIGNIFICANT CHANGE UP /HPF (ref 0–4)
SODIUM SERPL-SCNC: 138 MMOL/L — SIGNIFICANT CHANGE UP (ref 135–145)
SP GR SPEC: 1.02 — SIGNIFICANT CHANGE UP (ref 1.01–1.02)
UROBILINOGEN FLD QL: NEGATIVE MG/DL — SIGNIFICANT CHANGE UP
WBC # BLD: 9.44 K/UL — SIGNIFICANT CHANGE UP (ref 3.8–10.5)
WBC # FLD AUTO: 9.44 K/UL — SIGNIFICANT CHANGE UP (ref 3.8–10.5)
WBC UR QL: SIGNIFICANT CHANGE UP

## 2019-09-06 PROCEDURE — 86780 TREPONEMA PALLIDUM: CPT

## 2019-09-06 PROCEDURE — 85730 THROMBOPLASTIN TIME PARTIAL: CPT

## 2019-09-06 PROCEDURE — 81001 URINALYSIS AUTO W/SCOPE: CPT

## 2019-09-06 PROCEDURE — 84156 ASSAY OF PROTEIN URINE: CPT

## 2019-09-06 PROCEDURE — 80053 COMPREHEN METABOLIC PANEL: CPT

## 2019-09-06 PROCEDURE — 82570 ASSAY OF URINE CREATININE: CPT

## 2019-09-06 PROCEDURE — 36415 COLL VENOUS BLD VENIPUNCTURE: CPT

## 2019-09-06 PROCEDURE — 85610 PROTHROMBIN TIME: CPT

## 2019-09-06 PROCEDURE — 87389 HIV-1 AG W/HIV-1&-2 AB AG IA: CPT

## 2019-09-06 PROCEDURE — G0463: CPT

## 2019-09-06 PROCEDURE — 87086 URINE CULTURE/COLONY COUNT: CPT

## 2019-09-06 PROCEDURE — 86703 HIV-1/HIV-2 1 RESULT ANTBDY: CPT

## 2019-09-06 PROCEDURE — 59025 FETAL NON-STRESS TEST: CPT

## 2019-09-06 PROCEDURE — 85027 COMPLETE CBC AUTOMATED: CPT

## 2019-09-06 PROCEDURE — 86765 RUBEOLA ANTIBODY: CPT

## 2019-09-06 NOTE — OB PROVIDER TRIAGE NOTE - PMH
Hypertension    Insulin controlled gestational diabetes mellitus (GDM) during pregnancy, antepartum    Iron deficiency anemia, unspecified iron deficiency anemia type

## 2019-09-06 NOTE — OB PROVIDER TRIAGE NOTE - NSHPPHYSICALEXAM_GEN_ALL_CORE
Vital Signs Last 24 Hrs  HR: 83 (06 Sep 2019 14:37) (83 - 90)  BP: 135/79 (06 Sep 2019 14:37) (135/79 - 145/78)      FHT: 150 bpm, moderate variability, +acceleration, no deceleration - category I tracing.  [place]: No contraction  Pelvic: deferred    Physical exam:   -General: no acute distress, pleasant young woman  -Respiratory: no respiratory distress, able to speak in full sentences  -Abdomen: +gravid uterus  -Skin: warm, dry, and intact

## 2019-09-06 NOTE — OB PROVIDER TRIAGE NOTE - NSOBPROVIDERNOTE_OBGYN_ALL_OB_FT
33yo  at 36wk PMH GDM2 on insulin, iron deficiency anemia requiring iron transfusion, and obesity sent from Dr. Yun's office for workup on dizziness.   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 Lee resident, PGY2

## 2019-09-06 NOTE — OB PROVIDER TRIAGE NOTE - HISTORY OF PRESENT ILLNESS
35yo  at 36wk PMH GDM2 on insulin, iron deficiency anemia requiring iron transfusion s/p 1 transfusion , and obesity sent from Dr. Yun's office for workup on dizziness. She complains of persistent dizziness and headache that started after she vomited x1 yesterday afternoon, present on admission and not improving. She states that she has been hydrating poorly yesterday. She describes her headache as intermittently throbbing with severity of 6/10. Reports no vision change, no shortness of breath, no chest pain, and no abdominal pain. She reports no vaginal bleeding, no fluid leakage, no contractions, + fetal movement.     Pregnancy complicated by GDMA2 on Insulin. She is managed by MFM at Dr. Mcgraw's office.   Her current insulin is 44U long acting in AM, 40U long acting in PM, 16U short acting before breakfast, 10U short acting before dinner. She is unable to recall insulin names.     ObHx:   G1) () - SAB 12 wks, requiring D&C  G2) () - Term  8lbs without complication  PMH: Iron deficiency anemia - requiring iron infusion   PSH: Cholecystectomy (), D&C (), Colposcopy  Past GYN: No fibroids, cysts, STI's or abnormal PAP. Last pap 2017  Meds: Insulin, Iron oral supplement, iron infusion - infusion #1 out of 5 was on 19. Infusion #2 was scheduled for today (Dr. Anderson hematology)

## 2019-09-07 LAB
CULTURE RESULTS: SIGNIFICANT CHANGE UP
HIV 1+2 AB+HIV1 P24 AG SERPL QL IA: SIGNIFICANT CHANGE UP
MEV IGG SER-ACNC: 163 AU/ML — SIGNIFICANT CHANGE UP
MEV IGG+IGM SER-IMP: POSITIVE — SIGNIFICANT CHANGE UP
SPECIMEN SOURCE: SIGNIFICANT CHANGE UP
T PALLIDUM AB TITR SER: NEGATIVE — SIGNIFICANT CHANGE UP

## 2019-09-09 ENCOUNTER — INPATIENT (INPATIENT)
Facility: HOSPITAL | Age: 34
LOS: 0 days | Discharge: HOME | DRG: 833 | End: 2019-09-09
Attending: OBSTETRICS & GYNECOLOGY | Admitting: OBSTETRICS & GYNECOLOGY
Payer: MEDICAID

## 2019-09-09 ENCOUNTER — APPOINTMENT (OUTPATIENT)
Dept: ANTEPARTUM | Facility: CLINIC | Age: 34
End: 2019-09-09

## 2019-09-09 VITALS — DIASTOLIC BLOOD PRESSURE: 91 MMHG | SYSTOLIC BLOOD PRESSURE: 143 MMHG | HEART RATE: 80 BPM

## 2019-09-09 VITALS — SYSTOLIC BLOOD PRESSURE: 133 MMHG | HEART RATE: 83 BPM | DIASTOLIC BLOOD PRESSURE: 70 MMHG

## 2019-09-09 DIAGNOSIS — O47.03 FALSE LABOR BEFORE 37 COMPLETED WEEKS OF GESTATION, THIRD TRIMESTER: ICD-10-CM

## 2019-09-09 DIAGNOSIS — Z90.49 ACQUIRED ABSENCE OF OTHER SPECIFIED PARTS OF DIGESTIVE TRACT: Chronic | ICD-10-CM

## 2019-09-09 DIAGNOSIS — Z98.890 OTHER SPECIFIED POSTPROCEDURAL STATES: Chronic | ICD-10-CM

## 2019-09-09 DIAGNOSIS — O03.9 COMPLETE OR UNSPECIFIED SPONTANEOUS ABORTION WITHOUT COMPLICATION: Chronic | ICD-10-CM

## 2019-09-09 PROBLEM — D50.9 IRON DEFICIENCY ANEMIA, UNSPECIFIED: Chronic | Status: ACTIVE | Noted: 2019-09-06

## 2019-09-09 PROBLEM — O24.414 GESTATIONAL DIABETES MELLITUS IN PREGNANCY, INSULIN CONTROLLED: Chronic | Status: ACTIVE | Noted: 2019-09-06

## 2019-09-09 LAB
ALBUMIN SERPL ELPH-MCNC: 2.9 G/DL — LOW (ref 3.3–5.2)
ALP SERPL-CCNC: 256 U/L — HIGH (ref 40–120)
ALT FLD-CCNC: 23 U/L — SIGNIFICANT CHANGE UP
ANION GAP SERPL CALC-SCNC: 12 MMOL/L — SIGNIFICANT CHANGE UP (ref 5–17)
APPEARANCE UR: CLEAR — SIGNIFICANT CHANGE UP
AST SERPL-CCNC: 40 U/L — HIGH
BACTERIA # UR AUTO: NEGATIVE — SIGNIFICANT CHANGE UP
BASOPHILS # BLD AUTO: 0.02 K/UL — SIGNIFICANT CHANGE UP (ref 0–0.2)
BASOPHILS NFR BLD AUTO: 0.2 % — SIGNIFICANT CHANGE UP (ref 0–2)
BILIRUB SERPL-MCNC: 0.3 MG/DL — LOW (ref 0.4–2)
BILIRUB UR-MCNC: NEGATIVE — SIGNIFICANT CHANGE UP
BUN SERPL-MCNC: 9 MG/DL — SIGNIFICANT CHANGE UP (ref 8–20)
CALCIUM SERPL-MCNC: 8.9 MG/DL — SIGNIFICANT CHANGE UP (ref 8.6–10.2)
CHLORIDE SERPL-SCNC: 107 MMOL/L — SIGNIFICANT CHANGE UP (ref 98–107)
CO2 SERPL-SCNC: 19 MMOL/L — LOW (ref 22–29)
COLOR SPEC: YELLOW — SIGNIFICANT CHANGE UP
CREAT ?TM UR-MCNC: 26 MG/DL — SIGNIFICANT CHANGE UP
CREAT SERPL-MCNC: 0.53 MG/DL — SIGNIFICANT CHANGE UP (ref 0.5–1.3)
DIFF PNL FLD: NEGATIVE — SIGNIFICANT CHANGE UP
EOSINOPHIL # BLD AUTO: 0.1 K/UL — SIGNIFICANT CHANGE UP (ref 0–0.5)
EOSINOPHIL NFR BLD AUTO: 1.1 % — SIGNIFICANT CHANGE UP (ref 0–6)
EPI CELLS # UR: SIGNIFICANT CHANGE UP
GLUCOSE SERPL-MCNC: 94 MG/DL — SIGNIFICANT CHANGE UP (ref 70–115)
GLUCOSE UR QL: NEGATIVE MG/DL — SIGNIFICANT CHANGE UP
HCT VFR BLD CALC: 33.4 % — LOW (ref 34.5–45)
HGB BLD-MCNC: 10.7 G/DL — LOW (ref 11.5–15.5)
IMM GRANULOCYTES NFR BLD AUTO: 0.8 % — SIGNIFICANT CHANGE UP (ref 0–1.5)
KETONES UR-MCNC: NEGATIVE — SIGNIFICANT CHANGE UP
LEUKOCYTE ESTERASE UR-ACNC: NEGATIVE — SIGNIFICANT CHANGE UP
LYMPHOCYTES # BLD AUTO: 1.83 K/UL — SIGNIFICANT CHANGE UP (ref 1–3.3)
LYMPHOCYTES # BLD AUTO: 20.7 % — SIGNIFICANT CHANGE UP (ref 13–44)
MCHC RBC-ENTMCNC: 26.6 PG — LOW (ref 27–34)
MCHC RBC-ENTMCNC: 32 GM/DL — SIGNIFICANT CHANGE UP (ref 32–36)
MCV RBC AUTO: 83.1 FL — SIGNIFICANT CHANGE UP (ref 80–100)
MONOCYTES # BLD AUTO: 0.59 K/UL — SIGNIFICANT CHANGE UP (ref 0–0.9)
MONOCYTES NFR BLD AUTO: 6.7 % — SIGNIFICANT CHANGE UP (ref 2–14)
NEUTROPHILS # BLD AUTO: 6.24 K/UL — SIGNIFICANT CHANGE UP (ref 1.8–7.4)
NEUTROPHILS NFR BLD AUTO: 70.5 % — SIGNIFICANT CHANGE UP (ref 43–77)
NITRITE UR-MCNC: NEGATIVE — SIGNIFICANT CHANGE UP
PH UR: 7 — SIGNIFICANT CHANGE UP (ref 5–8)
PLATELET # BLD AUTO: 298 K/UL — SIGNIFICANT CHANGE UP (ref 150–400)
POTASSIUM SERPL-MCNC: 4.3 MMOL/L — SIGNIFICANT CHANGE UP (ref 3.5–5.3)
POTASSIUM SERPL-SCNC: 4.3 MMOL/L — SIGNIFICANT CHANGE UP (ref 3.5–5.3)
PROT ?TM UR-MCNC: 25 MG/DL — HIGH (ref 0–12)
PROT SERPL-MCNC: 6.7 G/DL — SIGNIFICANT CHANGE UP (ref 6.6–8.7)
PROT UR-MCNC: 30 MG/DL
PROT/CREAT UR-RTO: 1 RATIO — HIGH
RBC # BLD: 4.02 M/UL — SIGNIFICANT CHANGE UP (ref 3.8–5.2)
RBC # FLD: 13.2 % — SIGNIFICANT CHANGE UP (ref 10.3–14.5)
RBC CASTS # UR COMP ASSIST: NEGATIVE /HPF — SIGNIFICANT CHANGE UP (ref 0–4)
SODIUM SERPL-SCNC: 138 MMOL/L — SIGNIFICANT CHANGE UP (ref 135–145)
SP GR SPEC: 1 — LOW (ref 1.01–1.02)
UROBILINOGEN FLD QL: NEGATIVE MG/DL — SIGNIFICANT CHANGE UP
WBC # BLD: 8.85 K/UL — SIGNIFICANT CHANGE UP (ref 3.8–10.5)
WBC # FLD AUTO: 8.85 K/UL — SIGNIFICANT CHANGE UP (ref 3.8–10.5)
WBC UR QL: SIGNIFICANT CHANGE UP

## 2019-09-09 PROCEDURE — 76821 MIDDLE CEREBRAL ARTERY ECHO: CPT | Mod: 26

## 2019-09-09 PROCEDURE — 76815 OB US LIMITED FETUS(S): CPT | Mod: 26,59

## 2019-09-09 PROCEDURE — 76820 UMBILICAL ARTERY ECHO: CPT | Mod: 26

## 2019-09-09 PROCEDURE — 76819 FETAL BIOPHYS PROFIL W/O NST: CPT | Mod: 26

## 2019-09-09 RX ORDER — ACETAMINOPHEN 500 MG
650 TABLET ORAL ONCE
Refills: 0 | Status: DISCONTINUED | OUTPATIENT
Start: 2019-09-09 | End: 2019-09-09

## 2019-09-09 NOTE — OB PROVIDER TRIAGE NOTE - HISTORY OF PRESENT ILLNESS
33yo  at 36wk PMH GDM2 on insulin, iron deficiency anemia requiring iron transfusion s/p 1 transfusion , and obesity sent from Dr. Yun's office for workup for r/o preeclampsia/proteinuria    ObHx:   G1) () - SAB 12 wks, requiring D&C  G2) () - Term  8lbs without complication  PMH: Iron deficiency anemia - requiring iron infusion   PSH: Cholecystectomy (), D&C (), Colposcopy  Past GYN: No fibroids, cysts, STI's or abnormal PAP. Last pap 2017  Meds: Insulin, Iron oral supplement, iron infusion - infusion #1 out of 5 was on 19. Infusion #2 was scheduled for today (Dr. Anderson hematology) 33yo  at 36w3d PMH GDM2 on insulin, iron deficiency anemia requiring iron transfusion s/p 1 transfusion , and obesity sent from the office for r/o PIH due to elevated BPs.  On presentation, patient reports she had a headache which then spontaneously resolved. Denies blurry vision, CP, SOB, RUQ pain, vaginal bleeding, loss of fluid, or contractions. Endorses fetal movement.     ObHx:   G1) () - SAB 12 wks, requiring D&C  G2) () - Term  8lbs without complication  PMH: Iron deficiency anemia - requiring iron infusion   PSH: Cholecystectomy (), D&C (), Colposcopy  Past GYN: R ovarian cyst approx 6x4cm. No fibroids, STI's or abnormal PAP. Last pap 2017  Meds: Insulin, Iron oral supplement, iron infusion - infusion #1 out of 5 was on 19. Infusion #2 was scheduled for today (Dr. Anderson hematology)    T(C): 36.8 (19 @ 14:26), Max: 36.8 (19 @ 14:26)  T(F): 98.2 (19 @ 14:26), Max: 98.2 (19 @ 14:26)  HR: 82 (19 @ 15:58) (77 - 82)  BP: 131/71 (19 @ 15:58) (129/66 - 155/96)  RR: 12 (19 @ 14:26) (12 - 12)      FHT: Cat 1, reactive.   [place]: quiet  SVE: deferred    A/P  33yo  at 36w3d with pregnancy complicated by GDMA2 on Insulin, Anemia, and obesity here for r/o PIH.   PIH labs WNL except Pr/Cr of 1.0. AST slightly elevated - patient was referred to hepatologist as outpatient but never went to her appointment.   Headache resolved spontaneously.  After review of labs and discussion with Dr. Blair ([facility]), patient now meets criteria for pre-eclampsia without severe features.   Induction still scheduled for 37w due to poorly controlled GDMA2.     d/w Dr. Blair and Dr. Yun 35yo  at 36w3d PMH GDM2 on insulin, iron deficiency anemia requiring iron transfusion s/p 1 transfusion , and obesity sent from the office for r/o PIH due to elevated BPs.  On presentation, patient reports she had a headache which then spontaneously resolved. Denies blurry vision, CP, SOB, RUQ pain, vaginal bleeding, loss of fluid, or contractions. Endorses fetal movement.     ObHx:   G1) () - SAB 12 wks, requiring D&C  G2) () - Term  8lbs without complication  PMH: Iron deficiency anemia - requiring iron infusion   PSH: Cholecystectomy (), D&C (), Colposcopy  Past GYN: R ovarian cyst approx 6x4cm. No fibroids, STI's or abnormal PAP. Last pap 2017  Meds: Insulin, Iron oral supplement, iron infusion - infusion #1 out of 5 was on 19. Infusion #2 was scheduled for today (Dr. Anderson hematology)    T(C): 36.8 (19 @ 14:26), Max: 36.8 (19 @ 14:26)  T(F): 98.2 (19 @ 14:26), Max: 98.2 (19 @ 14:26)  HR: 82 (19 @ 15:58) (77 - 82)  BP: 131/71 (19 @ 15:58) (129/66 - 155/96)  RR: 12 (19 @ 14:26) (12 - 12)    FHT: Cat 1, reactive.   [place]: quiet  SVE: deferred    A/P  35yo  at 36w3d with pregnancy complicated by GDMA2 on Insulin, Anemia, and obesity here for r/o PIH.   PIH labs WNL except Pr/Cr of 1.0. AST slightly elevated - patient was referred to hepatologist as outpatient but never went to her appointment.   Headache resolved spontaneously.  After review of labs and discussion with Dr. Blair ([facility]), patient now meets criteria for pre-eclampsia without severe features.   Induction still scheduled for 37w due to poorly controlled GDMA2.   Twice weekly fetal  testing recommended.   FU in office in 24hr for BP check.    d/w Dr. Blair and Dr. Yun

## 2019-09-11 ENCOUNTER — INPATIENT (INPATIENT)
Facility: HOSPITAL | Age: 34
LOS: 1 days | Discharge: HOME | End: 2019-09-13
Attending: OBSTETRICS & GYNECOLOGY | Admitting: OBSTETRICS & GYNECOLOGY
Payer: MEDICAID

## 2019-09-11 ENCOUNTER — RESULT REVIEW (OUTPATIENT)
Age: 34
End: 2019-09-11

## 2019-09-11 VITALS — TEMPERATURE: 98 F | RESPIRATION RATE: 18 BRPM

## 2019-09-11 DIAGNOSIS — O26.893 OTHER SPECIFIED PREGNANCY RELATED CONDITIONS, THIRD TRIMESTER: ICD-10-CM

## 2019-09-11 DIAGNOSIS — Z90.49 ACQUIRED ABSENCE OF OTHER SPECIFIED PARTS OF DIGESTIVE TRACT: Chronic | ICD-10-CM

## 2019-09-11 DIAGNOSIS — O47.1 FALSE LABOR AT OR AFTER 37 COMPLETED WEEKS OF GESTATION: ICD-10-CM

## 2019-09-11 DIAGNOSIS — O03.9 COMPLETE OR UNSPECIFIED SPONTANEOUS ABORTION WITHOUT COMPLICATION: Chronic | ICD-10-CM

## 2019-09-11 DIAGNOSIS — Z98.890 OTHER SPECIFIED POSTPROCEDURAL STATES: Chronic | ICD-10-CM

## 2019-09-11 LAB
ALBUMIN SERPL ELPH-MCNC: 2.8 G/DL — LOW (ref 3.3–5.2)
ALP SERPL-CCNC: 272 U/L — HIGH (ref 40–120)
ALT FLD-CCNC: 28 U/L — SIGNIFICANT CHANGE UP
ANION GAP SERPL CALC-SCNC: 13 MMOL/L — SIGNIFICANT CHANGE UP (ref 5–17)
APPEARANCE UR: CLEAR — SIGNIFICANT CHANGE UP
AST SERPL-CCNC: 48 U/L — HIGH
BACTERIA # UR AUTO: ABNORMAL
BASOPHILS # BLD AUTO: 0.02 K/UL — SIGNIFICANT CHANGE UP (ref 0–0.2)
BASOPHILS NFR BLD AUTO: 0.2 % — SIGNIFICANT CHANGE UP (ref 0–2)
BILIRUB SERPL-MCNC: 0.4 MG/DL — SIGNIFICANT CHANGE UP (ref 0.4–2)
BILIRUB UR-MCNC: NEGATIVE — SIGNIFICANT CHANGE UP
BLD GP AB SCN SERPL QL: SIGNIFICANT CHANGE UP
BUN SERPL-MCNC: 7 MG/DL — LOW (ref 8–20)
CALCIUM SERPL-MCNC: 9 MG/DL — SIGNIFICANT CHANGE UP (ref 8.6–10.2)
CHLORIDE SERPL-SCNC: 109 MMOL/L — HIGH (ref 98–107)
CO2 SERPL-SCNC: 18 MMOL/L — LOW (ref 22–29)
COLOR SPEC: YELLOW — SIGNIFICANT CHANGE UP
CREAT SERPL-MCNC: 0.55 MG/DL — SIGNIFICANT CHANGE UP (ref 0.5–1.3)
DIFF PNL FLD: ABNORMAL
EOSINOPHIL # BLD AUTO: 0.09 K/UL — SIGNIFICANT CHANGE UP (ref 0–0.5)
EOSINOPHIL NFR BLD AUTO: 0.9 % — SIGNIFICANT CHANGE UP (ref 0–6)
EPI CELLS # UR: SIGNIFICANT CHANGE UP
GLUCOSE BLDC GLUCOMTR-MCNC: 72 MG/DL — SIGNIFICANT CHANGE UP (ref 70–99)
GLUCOSE BLDC GLUCOMTR-MCNC: 79 MG/DL — SIGNIFICANT CHANGE UP (ref 70–99)
GLUCOSE BLDC GLUCOMTR-MCNC: 79 MG/DL — SIGNIFICANT CHANGE UP (ref 70–99)
GLUCOSE SERPL-MCNC: 102 MG/DL — SIGNIFICANT CHANGE UP (ref 70–115)
GLUCOSE UR QL: NEGATIVE MG/DL — SIGNIFICANT CHANGE UP
HCT VFR BLD CALC: 35.7 % — SIGNIFICANT CHANGE UP (ref 34.5–45)
HCT VFR BLD CALC: 37.5 % — SIGNIFICANT CHANGE UP (ref 34.5–45)
HGB BLD-MCNC: 11.6 G/DL — SIGNIFICANT CHANGE UP (ref 11.5–15.5)
HGB BLD-MCNC: 12.1 G/DL — SIGNIFICANT CHANGE UP (ref 11.5–15.5)
HIV 1 & 2 AB SERPL IA.RAPID: SIGNIFICANT CHANGE UP
HIV 1+2 AB+HIV1 P24 AG SERPL QL IA: SIGNIFICANT CHANGE UP
IMM GRANULOCYTES NFR BLD AUTO: 0.9 % — SIGNIFICANT CHANGE UP (ref 0–1.5)
KETONES UR-MCNC: NEGATIVE — SIGNIFICANT CHANGE UP
LEUKOCYTE ESTERASE UR-ACNC: NEGATIVE — SIGNIFICANT CHANGE UP
LYMPHOCYTES # BLD AUTO: 1.92 K/UL — SIGNIFICANT CHANGE UP (ref 1–3.3)
LYMPHOCYTES # BLD AUTO: 19.8 % — SIGNIFICANT CHANGE UP (ref 13–44)
MCHC RBC-ENTMCNC: 26.9 PG — LOW (ref 27–34)
MCHC RBC-ENTMCNC: 26.9 PG — LOW (ref 27–34)
MCHC RBC-ENTMCNC: 32.3 GM/DL — SIGNIFICANT CHANGE UP (ref 32–36)
MCHC RBC-ENTMCNC: 32.5 GM/DL — SIGNIFICANT CHANGE UP (ref 32–36)
MCV RBC AUTO: 82.8 FL — SIGNIFICANT CHANGE UP (ref 80–100)
MCV RBC AUTO: 83.3 FL — SIGNIFICANT CHANGE UP (ref 80–100)
MONOCYTES # BLD AUTO: 0.59 K/UL — SIGNIFICANT CHANGE UP (ref 0–0.9)
MONOCYTES NFR BLD AUTO: 6.1 % — SIGNIFICANT CHANGE UP (ref 2–14)
NEUTROPHILS # BLD AUTO: 6.97 K/UL — SIGNIFICANT CHANGE UP (ref 1.8–7.4)
NEUTROPHILS NFR BLD AUTO: 72.1 % — SIGNIFICANT CHANGE UP (ref 43–77)
NITRITE UR-MCNC: NEGATIVE — SIGNIFICANT CHANGE UP
PH UR: 7 — SIGNIFICANT CHANGE UP (ref 5–8)
PLATELET # BLD AUTO: 325 K/UL — SIGNIFICANT CHANGE UP (ref 150–400)
PLATELET # BLD AUTO: 339 K/UL — SIGNIFICANT CHANGE UP (ref 150–400)
POTASSIUM SERPL-MCNC: 4.4 MMOL/L — SIGNIFICANT CHANGE UP (ref 3.5–5.3)
POTASSIUM SERPL-SCNC: 4.4 MMOL/L — SIGNIFICANT CHANGE UP (ref 3.5–5.3)
PROT SERPL-MCNC: 6.3 G/DL — LOW (ref 6.6–8.7)
PROT UR-MCNC: 30 MG/DL
RBC # BLD: 4.31 M/UL — SIGNIFICANT CHANGE UP (ref 3.8–5.2)
RBC # BLD: 4.5 M/UL — SIGNIFICANT CHANGE UP (ref 3.8–5.2)
RBC # FLD: 13.6 % — SIGNIFICANT CHANGE UP (ref 10.3–14.5)
RBC # FLD: 14.1 % — SIGNIFICANT CHANGE UP (ref 10.3–14.5)
RBC CASTS # UR COMP ASSIST: ABNORMAL /HPF (ref 0–4)
SODIUM SERPL-SCNC: 140 MMOL/L — SIGNIFICANT CHANGE UP (ref 135–145)
SP GR SPEC: 1 — LOW (ref 1.01–1.02)
T PALLIDUM AB TITR SER: NEGATIVE — SIGNIFICANT CHANGE UP
UROBILINOGEN FLD QL: NEGATIVE MG/DL — SIGNIFICANT CHANGE UP
WBC # BLD: 21.29 K/UL — HIGH (ref 3.8–10.5)
WBC # BLD: 9.68 K/UL — SIGNIFICANT CHANGE UP (ref 3.8–10.5)
WBC # FLD AUTO: 21.29 K/UL — HIGH (ref 3.8–10.5)
WBC # FLD AUTO: 9.68 K/UL — SIGNIFICANT CHANGE UP (ref 3.8–10.5)
WBC UR QL: SIGNIFICANT CHANGE UP

## 2019-09-11 PROCEDURE — 88307 TISSUE EXAM BY PATHOLOGIST: CPT | Mod: 26

## 2019-09-11 RX ORDER — DEXTROSE 50 % IN WATER 50 %
15 SYRINGE (ML) INTRAVENOUS ONCE
Refills: 0 | Status: DISCONTINUED | OUTPATIENT
Start: 2019-09-11 | End: 2019-09-13

## 2019-09-11 RX ORDER — LANOLIN
1 OINTMENT (GRAM) TOPICAL EVERY 6 HOURS
Refills: 0 | Status: DISCONTINUED | OUTPATIENT
Start: 2019-09-11 | End: 2019-09-13

## 2019-09-11 RX ORDER — DEXTROSE 50 % IN WATER 50 %
25 SYRINGE (ML) INTRAVENOUS ONCE
Refills: 0 | Status: DISCONTINUED | OUTPATIENT
Start: 2019-09-11 | End: 2019-09-13

## 2019-09-11 RX ORDER — SIMETHICONE 80 MG/1
80 TABLET, CHEWABLE ORAL EVERY 4 HOURS
Refills: 0 | Status: DISCONTINUED | OUTPATIENT
Start: 2019-09-11 | End: 2019-09-13

## 2019-09-11 RX ORDER — OXYCODONE HYDROCHLORIDE 5 MG/1
5 TABLET ORAL ONCE
Refills: 0 | Status: DISCONTINUED | OUTPATIENT
Start: 2019-09-11 | End: 2019-09-13

## 2019-09-11 RX ORDER — GLYCERIN ADULT
1 SUPPOSITORY, RECTAL RECTAL AT BEDTIME
Refills: 0 | Status: DISCONTINUED | OUTPATIENT
Start: 2019-09-11 | End: 2019-09-13

## 2019-09-11 RX ORDER — OXYTOCIN 10 UNIT/ML
4 VIAL (ML) INJECTION
Qty: 30 | Refills: 0 | Status: DISCONTINUED | OUTPATIENT
Start: 2019-09-11 | End: 2019-09-13

## 2019-09-11 RX ORDER — HYDRALAZINE HCL 50 MG
5 TABLET ORAL
Refills: 0 | Status: DISCONTINUED | OUTPATIENT
Start: 2019-09-11 | End: 2019-09-11

## 2019-09-11 RX ORDER — IBUPROFEN 200 MG
600 TABLET ORAL EVERY 6 HOURS
Refills: 0 | Status: COMPLETED | OUTPATIENT
Start: 2019-09-11 | End: 2020-08-09

## 2019-09-11 RX ORDER — OXYCODONE HYDROCHLORIDE 5 MG/1
5 TABLET ORAL
Refills: 0 | Status: DISCONTINUED | OUTPATIENT
Start: 2019-09-11 | End: 2019-09-13

## 2019-09-11 RX ORDER — HYDRALAZINE HCL 50 MG
5 TABLET ORAL ONCE
Refills: 0 | Status: COMPLETED | OUTPATIENT
Start: 2019-09-11 | End: 2019-09-11

## 2019-09-11 RX ORDER — AMPICILLIN TRIHYDRATE 250 MG
2 CAPSULE ORAL ONCE
Refills: 0 | Status: COMPLETED | OUTPATIENT
Start: 2019-09-11 | End: 2019-09-11

## 2019-09-11 RX ORDER — ACETAMINOPHEN 500 MG
975 TABLET ORAL
Refills: 0 | Status: DISCONTINUED | OUTPATIENT
Start: 2019-09-11 | End: 2019-09-13

## 2019-09-11 RX ORDER — DOCUSATE SODIUM 100 MG
100 CAPSULE ORAL
Refills: 0 | Status: DISCONTINUED | OUTPATIENT
Start: 2019-09-11 | End: 2019-09-13

## 2019-09-11 RX ORDER — SODIUM CHLORIDE 9 MG/ML
1000 INJECTION, SOLUTION INTRAVENOUS
Refills: 0 | Status: DISCONTINUED | OUTPATIENT
Start: 2019-09-11 | End: 2019-09-13

## 2019-09-11 RX ORDER — DIBUCAINE 1 %
1 OINTMENT (GRAM) RECTAL EVERY 6 HOURS
Refills: 0 | Status: DISCONTINUED | OUTPATIENT
Start: 2019-09-11 | End: 2019-09-13

## 2019-09-11 RX ORDER — GLUCAGON INJECTION, SOLUTION 0.5 MG/.1ML
1 INJECTION, SOLUTION SUBCUTANEOUS ONCE
Refills: 0 | Status: DISCONTINUED | OUTPATIENT
Start: 2019-09-11 | End: 2019-09-13

## 2019-09-11 RX ORDER — MAGNESIUM SULFATE 500 MG/ML
4 VIAL (ML) INJECTION ONCE
Refills: 0 | Status: COMPLETED | OUTPATIENT
Start: 2019-09-11 | End: 2019-09-11

## 2019-09-11 RX ORDER — LABETALOL HCL 100 MG
200 TABLET ORAL EVERY 12 HOURS
Refills: 0 | Status: DISCONTINUED | OUTPATIENT
Start: 2019-09-11 | End: 2019-09-12

## 2019-09-11 RX ORDER — BUTORPHANOL TARTRATE 2 MG/ML
2 INJECTION, SOLUTION INTRAMUSCULAR; INTRAVENOUS ONCE
Refills: 0 | Status: DISCONTINUED | OUTPATIENT
Start: 2019-09-11 | End: 2019-09-11

## 2019-09-11 RX ORDER — DIPHENHYDRAMINE HCL 50 MG
25 CAPSULE ORAL EVERY 6 HOURS
Refills: 0 | Status: DISCONTINUED | OUTPATIENT
Start: 2019-09-11 | End: 2019-09-13

## 2019-09-11 RX ORDER — AMPICILLIN TRIHYDRATE 250 MG
1 CAPSULE ORAL EVERY 4 HOURS
Refills: 0 | Status: DISCONTINUED | OUTPATIENT
Start: 2019-09-11 | End: 2019-09-11

## 2019-09-11 RX ORDER — PRAMOXINE HYDROCHLORIDE 150 MG/15G
1 AEROSOL, FOAM RECTAL EVERY 4 HOURS
Refills: 0 | Status: DISCONTINUED | OUTPATIENT
Start: 2019-09-11 | End: 2019-09-13

## 2019-09-11 RX ORDER — HYDRALAZINE HCL 50 MG
5 TABLET ORAL ONCE
Refills: 0 | Status: DISCONTINUED | OUTPATIENT
Start: 2019-09-11 | End: 2019-09-11

## 2019-09-11 RX ORDER — OXYTOCIN 10 UNIT/ML
333.33 VIAL (ML) INJECTION
Qty: 20 | Refills: 0 | Status: DISCONTINUED | OUTPATIENT
Start: 2019-09-11 | End: 2019-09-13

## 2019-09-11 RX ORDER — BUTORPHANOL TARTRATE 2 MG/ML
2 INJECTION, SOLUTION INTRAMUSCULAR; INTRAVENOUS EVERY 4 HOURS
Refills: 0 | Status: DISCONTINUED | OUTPATIENT
Start: 2019-09-11 | End: 2019-09-13

## 2019-09-11 RX ORDER — KETOROLAC TROMETHAMINE 30 MG/ML
30 SYRINGE (ML) INJECTION ONCE
Refills: 0 | Status: DISCONTINUED | OUTPATIENT
Start: 2019-09-11 | End: 2019-09-11

## 2019-09-11 RX ORDER — SODIUM CHLORIDE 9 MG/ML
1000 INJECTION, SOLUTION INTRAVENOUS
Refills: 0 | Status: DISCONTINUED | OUTPATIENT
Start: 2019-09-11 | End: 2019-09-12

## 2019-09-11 RX ORDER — BENZOCAINE 10 %
1 GEL (GRAM) MUCOUS MEMBRANE EVERY 6 HOURS
Refills: 0 | Status: DISCONTINUED | OUTPATIENT
Start: 2019-09-11 | End: 2019-09-13

## 2019-09-11 RX ORDER — INSULIN LISPRO 100/ML
VIAL (ML) SUBCUTANEOUS
Refills: 0 | Status: DISCONTINUED | OUTPATIENT
Start: 2019-09-11 | End: 2019-09-13

## 2019-09-11 RX ORDER — SODIUM CHLORIDE 9 MG/ML
3 INJECTION INTRAMUSCULAR; INTRAVENOUS; SUBCUTANEOUS EVERY 8 HOURS
Refills: 0 | Status: DISCONTINUED | OUTPATIENT
Start: 2019-09-11 | End: 2019-09-13

## 2019-09-11 RX ORDER — AER TRAVELER 0.5 G/1
1 SOLUTION RECTAL; TOPICAL EVERY 4 HOURS
Refills: 0 | Status: DISCONTINUED | OUTPATIENT
Start: 2019-09-11 | End: 2019-09-13

## 2019-09-11 RX ORDER — CITRIC ACID/SODIUM CITRATE 300-500 MG
30 SOLUTION, ORAL ORAL ONCE
Refills: 0 | Status: DISCONTINUED | OUTPATIENT
Start: 2019-09-11 | End: 2019-09-12

## 2019-09-11 RX ORDER — TETANUS TOXOID, REDUCED DIPHTHERIA TOXOID AND ACELLULAR PERTUSSIS VACCINE, ADSORBED 5; 2.5; 8; 8; 2.5 [IU]/.5ML; [IU]/.5ML; UG/.5ML; UG/.5ML; UG/.5ML
0.5 SUSPENSION INTRAMUSCULAR ONCE
Refills: 0 | Status: COMPLETED | OUTPATIENT
Start: 2019-09-11

## 2019-09-11 RX ORDER — MAGNESIUM HYDROXIDE 400 MG/1
30 TABLET, CHEWABLE ORAL
Refills: 0 | Status: DISCONTINUED | OUTPATIENT
Start: 2019-09-11 | End: 2019-09-13

## 2019-09-11 RX ORDER — DEXTROSE 50 % IN WATER 50 %
12.5 SYRINGE (ML) INTRAVENOUS ONCE
Refills: 0 | Status: DISCONTINUED | OUTPATIENT
Start: 2019-09-11 | End: 2019-09-13

## 2019-09-11 RX ORDER — HYDROCORTISONE 1 %
1 OINTMENT (GRAM) TOPICAL EVERY 6 HOURS
Refills: 0 | Status: DISCONTINUED | OUTPATIENT
Start: 2019-09-11 | End: 2019-09-13

## 2019-09-11 RX ORDER — MAGNESIUM SULFATE 500 MG/ML
2 VIAL (ML) INJECTION
Qty: 40 | Refills: 0 | Status: DISCONTINUED | OUTPATIENT
Start: 2019-09-11 | End: 2019-09-12

## 2019-09-11 RX ADMIN — Medication 108 GRAM(S): at 10:26

## 2019-09-11 RX ADMIN — Medication 108 GRAM(S): at 14:33

## 2019-09-11 RX ADMIN — Medication 108 GRAM(S): at 18:08

## 2019-09-11 RX ADMIN — BUTORPHANOL TARTRATE 2 MILLIGRAM(S): 2 INJECTION, SOLUTION INTRAMUSCULAR; INTRAVENOUS at 10:11

## 2019-09-11 RX ADMIN — Medication 5 MILLIGRAM(S): at 21:45

## 2019-09-11 RX ADMIN — BUTORPHANOL TARTRATE 2 MILLIGRAM(S): 2 INJECTION, SOLUTION INTRAMUSCULAR; INTRAVENOUS at 15:01

## 2019-09-11 RX ADMIN — BUTORPHANOL TARTRATE 2 MILLIGRAM(S): 2 INJECTION, SOLUTION INTRAMUSCULAR; INTRAVENOUS at 10:25

## 2019-09-11 RX ADMIN — Medication 5 MILLIGRAM(S): at 09:50

## 2019-09-11 RX ADMIN — Medication 1000 MILLIUNIT(S)/MIN: at 22:42

## 2019-09-11 RX ADMIN — Medication 50 GM/HR: at 22:41

## 2019-09-11 RX ADMIN — Medication 200 GRAM(S): at 21:40

## 2019-09-11 RX ADMIN — BUTORPHANOL TARTRATE 2 MILLIGRAM(S): 2 INJECTION, SOLUTION INTRAMUSCULAR; INTRAVENOUS at 15:16

## 2019-09-11 RX ADMIN — SODIUM CHLORIDE 125 MILLILITER(S): 9 INJECTION, SOLUTION INTRAVENOUS at 10:27

## 2019-09-11 RX ADMIN — Medication 30 MILLIGRAM(S): at 23:20

## 2019-09-11 RX ADMIN — Medication 216 GRAM(S): at 06:31

## 2019-09-11 RX ADMIN — SODIUM CHLORIDE 125 MILLILITER(S): 9 INJECTION, SOLUTION INTRAVENOUS at 05:43

## 2019-09-11 RX ADMIN — Medication 30 MILLIGRAM(S): at 22:48

## 2019-09-11 NOTE — OB PROVIDER DELIVERY SUMMARY - NS_TIMERUPTURED_OBGYN_ALL_OB_FT
I have seen and examined this patient with the stroke neurology team.     History was reviewed with the patient and/or chart review.   ROS: All negative except documented in the HPI.   Neurological exam was performed and agree with exam as documented above.   Laboratory results and imaging studies were reviewed by me.   I agree with the neurology resident note as documented above. 17 Hour(s) 26 Minute(s)

## 2019-09-11 NOTE — OB PROVIDER H&P - HISTORY OF PRESENT ILLNESS
Patient is a 33yo  at 36 weeks and 5 days dated by LMP 2018 DELORES 10/04/2019 coming in with contractions and rupture of membranes. Denies VB. +FM.  Pregnancy complicated by:   -cHTN (gHTN as per pt, not medicated) as per records she was started on labetalol 100mg BID at time of dx  -A2GDM on insulin  -AMA  -Obesity BMI 43.6  -Trasnaminitis (seen by GI)  -Ovarian cyst 8.3x7.3x5.5  -Anemia treated with IV iron  -Placentomegaly with placental lakes   -Elevated uterine artery dopplers 95%    PMH: HTN, A2GDM (possible pregestational and poorly controlled)   PSH: tim, D&C, colpo  Meds: 40 NPH AM and PM, 10 novolog pre-meals  Allergies: none  GYNhx: ovarian cyst 8.3x7.3x5.5  OBhx: 2001 FT  8lb

## 2019-09-11 NOTE — OB PROVIDER H&P - PRO INTERPRETER NEED 2
In Dr. Weaver's absence, spoke with pt and told her that she has 3 or so small thyroid nodules on each side, and none of them are suspicious enough to warrant a biopsy at this time.  Has ongoing work for prob hyperparathyroidism and will follow up with Dr. Weaver for this issue.
[place]

## 2019-09-11 NOTE — OB PROVIDER H&P - NSHPPHYSICALEXAM_GEN_ALL_CORE
Vital Signs Last 24 Hrs  T(C): 36.7 (11 Sep 2019 04:30), Max: 36.7 (11 Sep 2019 04:22)  T(F): 98.1 (11 Sep 2019 04:30), Max: 98.1 (11 Sep 2019 04:30)  HR: 79 (11 Sep 2019 05:41) (79 - 86)  BP: 145/74 (11 Sep 2019 05:41) (145/74 - 148/72)  RR: 16 (11 Sep 2019 04:30) (16 - 18)    FHT: cat I  [place]: irreg  VE: 3/90/-2  SSE: pooling, +nitrizine  Bedside US: vertex, anterior placenta   EFW: 3350g

## 2019-09-11 NOTE — CHART NOTE - NSCHARTNOTEFT_GEN_A_CORE
Pt is a GDM with recommendation for delivery at 37 wks  Her IOl was planned for 9/13/19  She has been getting twice weekly testing  She showed up with ruptured membranes  Pt will initially have expectant mgmt then augmented as needed  Diabetic orders will be written as well Pt is a GDM with recommendation for delivery at 37 wks  Her IOl was planned for 9/13/19  She has been getting twice weekly testing  She showed up with ruptured membranes  3cm dilated on admission  Pt will initially have expectant mgmt then augmented as needed  Diabetic orders will be written as well  She has had recent preeclamptic labs

## 2019-09-11 NOTE — OB PROVIDER DELIVERY SUMMARY - NSPROVIDERDELIVERYNOTE_OBGYN_ALL_OB_FT
Procedure: Normal vaginal delivery  Findings: Viable female infant delivered in cephalic presentation at 20:26, placenta delivered at 20:30. Apgar 9/9. Weight 7lbs 12oz (3510g)  Laceration: 2nd degree perineal   Repair: 3-0-Chromic  x1  Procedure: Patient felt rectal pressure and was found to be fully dilated, +2 station. In conjunction with maternal effort, she delivered a viable female infant with a 2nd degree perineal laceration that was repaired appropriately with 3-0 chromic on MH x 1. Placenta delivered intact. Excellent hemostasis obtained.

## 2019-09-11 NOTE — CHART NOTE - NSCHARTNOTEFT_GEN_A_CORE
S: patient doing well feeling slightly more contractions     O:   Vital Signs Last 24 Hrs  T(C): 36.8 (11 Sep 2019 11:36), Max: 36.9 (11 Sep 2019 07:41)  T(F): 98.24 (11 Sep 2019 11:36), Max: 98.42 (11 Sep 2019 07:41)  HR: 92 (11 Sep 2019 15:34) (73 - 97)  BP: 131/62 (11 Sep 2019 15:34) (126/61 - 172/81)  RR: 18 (11 Sep 2019 11:36) (16 - 18)    Abdomen: soft, nontender   SVE: 3/90/-2 vertex     FHT: baseline 130s moderate variability, accelerations present few intermittent early presentations     [place]: ctx irregular q 5-8 minutes    A/p  Patient with same cervical exam. Will discuss with Dr. Yun if plans to continue expectant management versus augmentation with pitocin.     Enrike VALNETIN S: patient doing well feeling slightly more contractions     O:   Vital Signs Last 24 Hrs  T(C): 36.8 (11 Sep 2019 11:36), Max: 36.9 (11 Sep 2019 07:41)  T(F): 98.24 (11 Sep 2019 11:36), Max: 98.42 (11 Sep 2019 07:41)  HR: 92 (11 Sep 2019 15:34) (73 - 97)  BP: 131/62 (11 Sep 2019 15:34) (126/61 - 172/81)  RR: 18 (11 Sep 2019 11:36) (16 - 18)    Abdomen: soft, nontender   SVE: 3/90/-2 vertex     FHT: baseline 130s moderate variability, accelerations present few intermittent early presentations     [place]: ctx irregular q 5-8 minutes    A/p  Patient with same cervical exam. Will discuss with Dr. Yun if plans to continue expectant management versus augmentation with pitocin.   BPs controlled, sugars within normal limits   Enrike VALENTIN

## 2019-09-11 NOTE — CHART NOTE - NSCHARTNOTEFT_GEN_A_CORE
09-11-19 @ 14:14  Patient was evaluated at bedside.  She is endorsing increased abdominal discomfort, but otherwise no additional complaints    Vital Signs Last 24 Hrs  T(C): 36.8 (11 Sep 2019 11:36), Max: 36.9 (11 Sep 2019 07:41)  T(F): 98.24 (11 Sep 2019 11:36), Max: 98.42 (11 Sep 2019 07:41)  HR: 93 (11 Sep 2019 14:05) (73 - 96)  BP: 144/76 (11 Sep 2019 14:05) (126/61 - 172/81)  RR: 18 (11 Sep 2019 11:36) (16 - 18)    FHT: 135bpm, moderate variability + accels, no decelerations present  [place]: Ctxs every 3-7 minutes.      Plan:  Reactive tracing  Patient met hypertensive protocol earlier this morning, s/p Hydralazine 5mg IVP x1. BPs now 130s-140s/60s-70s; denies any headaches, RUQ pain or visual changes.  Will continue to reassess prn  Expectant management for now

## 2019-09-11 NOTE — CHART NOTE - NSCHARTNOTEFT_GEN_A_CORE
Phone call received  Pt met protocol with BP  We will give IV hydralazine then reevaluate  Stadol written for pain Phone call received  Pt met protocol with BP  Pt has been asymptomatic for preeclampsia now and for the prior week  We will give IV hydralazine for the BP then reevaluate  Stadol written for pain

## 2019-09-11 NOTE — OB RN DELIVERY SUMMARY - NS_SEPSISRSKCALC_OBGYN_ALL_OB_FT
EOS calculated successfully. EOS Risk Factor: 0.5/1000 live births ([facility] national incidence); GA=36w5d; Temp=98.42; ROM=17.433; GBS='Unknown'; Antibiotics='Broad spectrum antibiotics 2-3.9 hrs prior to birth'

## 2019-09-11 NOTE — CHART NOTE - NSCHARTNOTEFT_GEN_A_CORE
Patient with multiple severe range BPs  Reports a headache, denies RUQ pain or vision changes   5mg IV hydralazine push STAT  STAT CBC/CMP, however patient with known transaminitis  Mg will be started for cHTN with superimposed pre-eclampsia with severe features   200mg labetalol PO to start tonight     D/W Dr. Yun

## 2019-09-11 NOTE — OB PROVIDER H&P - ASSESSMENT
A/P: Patient is a 35yo  at 36 weeks and 5 days dated by LMP 2018 DELORES 10/04/2019 coming in labor and with +ROM.   -Admit to L&D  -Expectant management  -GBS unknown, will start on ampicillin and obtain GBS culture  -ISS and DM order set  -Monitor BPs

## 2019-09-11 NOTE — CHART NOTE - NSCHARTNOTEFT_GEN_A_CORE
Called to evaluate pt -with c/o abdominal contractions and desires medication.  fh120 stable with accelerations noted-cat 1 and pt with contractions q 4-5 minutes noted.  Pelvic exam was performed that revealed cx-3-4cm/100%/-2 station. She has received iv sedation previously as per her request and has declined the epidural previously when asked by the nurse. Pt was given the option of expectant mx or augmentation with pitocin. Pt and aher  were counseled that augmentation of the contractions with pitocin would make the contractions stronger and closer together. She was again counseled regarding iv sedation as well as epidural. She stated that why did we not know that she would not deliver quickly?? She and her  were both counseled that was not a fair statement for any human to predict her exact time of delivery. She was counseled regarding the risks and benefits of both methods for pain relief -and I recommend that she have a consult with anesthesia and she could decide after the consult if she desires epidural or not. She and her  both agree at this time. We also spoke at length with them her and her  christopher: her hx diabetes and her noncompliance with her diabetes treatment regimen and the possible associated risk of maintaining or reclaiming her diabetes status after delivery.

## 2019-09-12 ENCOUNTER — TRANSCRIPTION ENCOUNTER (OUTPATIENT)
Age: 34
End: 2019-09-12

## 2019-09-12 DIAGNOSIS — Z71.89 OTHER SPECIFIED COUNSELING: ICD-10-CM

## 2019-09-12 LAB
GLUCOSE BLDC GLUCOMTR-MCNC: 123 MG/DL — HIGH (ref 70–99)
GLUCOSE BLDC GLUCOMTR-MCNC: 140 MG/DL — HIGH (ref 70–99)
GLUCOSE BLDC GLUCOMTR-MCNC: 85 MG/DL — SIGNIFICANT CHANGE UP (ref 70–99)

## 2019-09-12 RX ORDER — LABETALOL HCL 100 MG
100 TABLET ORAL
Refills: 0 | Status: DISCONTINUED | OUTPATIENT
Start: 2019-09-12 | End: 2019-09-12

## 2019-09-12 RX ORDER — LABETALOL HCL 100 MG
100 TABLET ORAL EVERY 12 HOURS
Refills: 0 | Status: DISCONTINUED | OUTPATIENT
Start: 2019-09-12 | End: 2019-09-13

## 2019-09-12 RX ORDER — INFLUENZA VIRUS VACCINE 15; 15; 15; 15 UG/.5ML; UG/.5ML; UG/.5ML; UG/.5ML
0.5 SUSPENSION INTRAMUSCULAR ONCE
Refills: 0 | Status: COMPLETED | OUTPATIENT
Start: 2019-09-12 | End: 2019-09-13

## 2019-09-12 RX ORDER — IBUPROFEN 200 MG
600 TABLET ORAL EVERY 6 HOURS
Refills: 0 | Status: DISCONTINUED | OUTPATIENT
Start: 2019-09-12 | End: 2019-09-13

## 2019-09-12 RX ADMIN — Medication 1 TABLET(S): at 13:45

## 2019-09-12 RX ADMIN — Medication 975 MILLIGRAM(S): at 22:06

## 2019-09-12 RX ADMIN — Medication 975 MILLIGRAM(S): at 08:34

## 2019-09-12 RX ADMIN — Medication 975 MILLIGRAM(S): at 23:00

## 2019-09-12 RX ADMIN — Medication 975 MILLIGRAM(S): at 14:45

## 2019-09-12 RX ADMIN — Medication 100 MILLIGRAM(S): at 10:26

## 2019-09-12 RX ADMIN — Medication 50 GM/HR: at 18:26

## 2019-09-12 RX ADMIN — Medication 100 MILLIGRAM(S): at 22:06

## 2019-09-12 RX ADMIN — Medication 975 MILLIGRAM(S): at 07:34

## 2019-09-12 RX ADMIN — Medication 975 MILLIGRAM(S): at 13:45

## 2019-09-12 NOTE — CHART NOTE - NSCHARTNOTEFT_GEN_A_CORE
S: Ms. Meena Casanova is a 35yo   at 36w 6d, on MgSO4 for cHTN w/ super imposed pre-eclampsia with severe features. Pt has no complaints, denies H/A, vision changes, RUQ, n/v or SOB.     O:  Vital Signs Last 24 Hrs  T(C): 36.5 (12 Sep 2019 15:30), Max: 37 (11 Sep 2019 22:00)  T(F): 97.7 (12 Sep 2019 15:30), Max: 98.6 (11 Sep 2019 22:00)  HR: 76 (12 Sep 2019 18:00) (75 - 114)  BP: 115/58 (12 Sep 2019 17:13) (104/52 - 203/83)  RR: 16 (12 Sep 2019 15:30) (14 - 20)  SpO2: 98% (12 Sep 2019 18:00) (75% - 100%)    Gen: Well-appearing, NAD  Cardiac: RRR, no mumurs, rubs, gallop  Pulm: CTAB, no wheezing, rales, rhonchi  Abd: Obese, fundus firm at the level of the umbilicus  Extremities: no edema, redness, +2 brachial reflex    Labs:                       12.1   21.29 )-----------( 339      ( 11 Sep 2019 22:13 )             37.5   -    140  |  109<H>  |  7.0<L>  ----------------------------<  102  4.4   |  18.0<L>  |  0.55    Ca    9.0      11 Sep 2019 22:13    TPro  6.3<L>  /  Alb  2.8<L>  /  TBili  0.4  /  DBili  x   /  AST  48<H>  /  ALT  28  /  AlkPhos  272<H>      Urine output  >300cc since last check 4 hours ago    A:  Ms. Meena Casanova is a 35yo d   at 36+6 gestational age, on MgSO4 for cHTN w/ super imposed pre-eclampsia with severe features    Plan:   -Continue to monitor  -No signs of Mg toxicity  -Continue with Mg for completion of 24 hour postpartum completion of mg therapy till 2200  -Continue with postpartum care S: Ms. Meena Casanova is a 33yo   at 36w 6d, on MgSO4 for cHTN w/ super imposed pre-eclampsia with severe features. Pt has no complaints, denies H/A, vision changes, RUQ, n/v or SOB.     O:  Vital Signs Last 24 Hrs  T(C): 36.5 (12 Sep 2019 15:30), Max: 37 (11 Sep 2019 22:00)  T(F): 97.7 (12 Sep 2019 15:30), Max: 98.6 (11 Sep 2019 22:00)  HR: 76 (12 Sep 2019 18:00) (75 - 114)  BP: 115/58 (12 Sep 2019 17:13) (104/52 - 203/83)  RR: 16 (12 Sep 2019 15:30) (14 - 20)  SpO2: 98% (12 Sep 2019 18:00) (75% - 100%)    Gen: Well-appearing, NAD  Cardiac: RRR, no murmurs, rubs, gallop  Pulm: CTAB, no wheezing, rales, rhonchi  Abd: Obese, fundus firm at the level of the umbilicus  Extremities: no edema, redness, +2 brachial reflex    Labs:                       12.1   21.29 )-----------( 339      ( 11 Sep 2019 22:13 )             37.5   -    140  |  109<H>  |  7.0<L>  ----------------------------<  102  4.4   |  18.0<L>  |  0.55    Ca    9.0      11 Sep 2019 22:13    TPro  6.3<L>  /  Alb  2.8<L>  /  TBili  0.4  /  DBili  x   /  AST  48<H>  /  ALT  28  /  AlkPhos  272<H>      Urine output  >300cc since last check 4 hours ago    A:  Ms. Meena Casanova is a 33yo d   at 36+6 gestational age, on MgSO4 for cHTN w/ super imposed pre-eclampsia with severe features    Plan:   -Continue to monitor  -No signs of Mg toxicity  -Continue with Mg for completion of 24 hour postpartum completion of mg therapy till 2200  -Continue with postpartum care    Pt seen, examined and note reviewed by PGY 2 Dr. Lee

## 2019-09-12 NOTE — PROGRESS NOTE ADULT - SUBJECTIVE AND OBJECTIVE BOX
Jen Coronado is a 34y woman  ; PPD# 1 s/p  at 36wks 6 days of a viable female infant. Patient has cHTN with superimposed pre-eclampsia, started on magnesium s/p delivery.     Subjective:  - The patient seen and examined at bedside. No acute overnight events. She denies any headache, scotoma, RUQ pain, nausea, vomiting.   - She feels well, pain is well controlled.   - She is ambulating and tolerating a diet.   - +Flatus, - BM. Patient is voiding without difficulty.   - She denies nausea/vomiting, breathing problems, headache and visual changes.  - Lochia wnl.  - Breastfeeding without difficulty.    Vital Signs Last 24 Hrs  T(C): 37 (11 Sep 2019 22:00), Max: 37 (11 Sep 2019 22:00)  T(F): 98.6 (11 Sep 2019 22:00), Max: 98.6 (11 Sep 2019 22:00)  HR: 83 (12 Sep 2019 05:48) (73 - 114)  BP: 109/55 (12 Sep 2019 04:01) (104/52 - 203/83)  RR: 16 (12 Sep 2019 04:00) (14 - 20)  SpO2: 97% (12 Sep 2019 05:48) (75% - 100%)    Physical exam: BPs have been well controlled with Mag, oral labetalol and IV hydralazine 5mg x 1 post partum.  General: NAD. Appears well.  No increased work of breathing.  Abdomen: soft, nontender, nondistended, firm uterine fundus.  Pelvic: Normal lochia noted.  Ext: No DVT signs, warm extremities, no edema, DTR 2+    Allergies    No Known Allergies    Intolerances        LABS:                        12.1   21.29 )-----------( 339      ( 11 Sep 2019 22:13 )             37.5     09-11    140  |  109<H>  |  7.0<L>  ----------------------------<  102  4.4   |  18.0<L>  |  0.55    Ca    9.0      11 Sep 2019 22:13    TPro  6.3<L>  /  Alb  2.8<L>  /  TBili  0.4  /  DBili  x   /  AST  48<H>  /  ALT  28  /  AlkPhos  272<H>

## 2019-09-12 NOTE — CHART NOTE - NSCHARTNOTEFT_GEN_A_CORE
S:   Patient evaluated at bedside for clinical magnesium check.   She denies visual disturbances including scotoma, headache and right upper quadrant pain.   Also denies nausea/vomiting/epigastric pain/shortness of breath. Pain well controlled.      T(C): 36.7 (09-12-19 @ 07:30), Max: 37 (09-12-19 @ 06:00)  HR: 78 (09-12-19 @ 10:47) (78 - 111)  BP: 130/63 (09-12-19 @ 10:27) (104/52 - 130/63)  RR: 16 (09-12-19 @ 09:30) (14 - 17)  SpO2: 96% (09-12-19 @ 10:47) (93% - 99%)      09-11 @ 07:01  -  09-12 @ 07:00  --------------------------------------------------------  IN: 1600 mL / OUT: 2923 mL / NET: -1323 mL    09-12 @ 07:01  -  09-12 @ 10:53  --------------------------------------------------------  IN: 250 mL / OUT: 100 mL / NET: 150 mL      Gen: NAD, AAOx3  CV: RRR, no M/R/G  Pulm: CTAB, no R/R/W  Abd: soft, nontender, no rebound or guarding, no epigastric tenderness, liver nonpalpable +BS, fundus palpated   : Degroot in place  Ext: +1 edema shaheen, SCDs in place, Brachioradialis reflexes 2+ bilaterally                          12.1   21.29 )-----------( 339      ( 11 Sep 2019 22:13 )             37.5     09-11    140  |  109<H>  |  7.0<L>  ----------------------------<  102  4.4   |  18.0<L>  |  0.55    Ca    9.0      11 Sep 2019 22:13    TPro  6.3<L>  /  Alb  2.8<L>  /  TBili  0.4  /  DBili  x   /  AST  48<H>  /  ALT  28  /  AlkPhos  272<H>  09-11 09-11-19 @ 07:01  -  09-12-19 @ 07:00  --------------------------------------------------------  IN: 1600 mL / OUT: 2923 mL / NET: -1323 mL    09-12-19 @ 07:01  -  09-12-19 @ 10:53  --------------------------------------------------------  IN: 250 mL / OUT: 100 mL / NET: 150 mL      Plan:  - Adequate urine output (500cc/3 hours --> 166cc/hr)  - BPs well controlled (112-130/50s-60s)l; will start patient on labetalol 100mg q12   - Will continue to monitor

## 2019-09-12 NOTE — PROGRESS NOTE ADULT - ATTENDING COMMENTS
day 1   htn and diabetes  started on magnesium due to BP  pt has been largely noncompliant the entire pregnancy  we will continue the magnesium until tonight then floor transfer  medical consult will be obtained to try to reinforce her compliance postpartum

## 2019-09-12 NOTE — PROGRESS NOTE ADULT - ASSESSMENT
Jen Coronado is a 34y woman  ; PPD# 1 s/p    -Continue Postpartum care  -Patient is receiving 24hrs of magnesium for cHTN with superimposed pre-eclampsia  -Continue with multimodal pain management  -continue with regular mag checks.

## 2019-09-12 NOTE — CHART NOTE - NSCHARTNOTEFT_GEN_A_CORE
Pt seen and examined at bedside. Pt has no acute complaints. Pt with baby in her arms. Pt denies any shortness of breath, headache, visual disturbances, n/v, RUQ pain     Vital Signs Last 24 Hrs  T(C): 36.7 (12 Sep 2019 11:30), Max: 37 (11 Sep 2019 22:00)  T(F): 98.1 (12 Sep 2019 11:30), Max: 98.6 (11 Sep 2019 22:00)  HR: 81 (12 Sep 2019 14:02) (77 - 114)  BP: 130/67 (12 Sep 2019 13:13) (104/52 - 203/83)  RR: 16 (12 Sep 2019 11:30) (14 - 20)  SpO2: 97% (12 Sep 2019 14:02) (75% - 100%)    Urine output is 350<- 345 <- 350 over the last 3 hours     Gen:  female, lying comfortably in bed, able to speak in full sentences, smiling in NAD  CV: +S1, +s2, no murmurs   Pul: CTA B/L  GI: +BS, obese abd, firm fundus difficult to assess due to body habitus  Ext: 2+ b/l brachial reflex, no clonus                          12.1   21.29 )-----------( 339      ( 11 Sep 2019 22:13 )             37.5         140  |  109<H>  |  7.0<L>  ----------------------------<  102  4.4   |  18.0<L>  |  0.55    Ca    9.0      11 Sep 2019 22:13    TPro  6.3<L>  /  Alb  2.8<L>  /  TBili  0.4  /  DBili  x   /  AST  48<H>  /  ALT  28  /  AlkPhos  272<H>           A/P: 34 year old  at 36+6 gestational age, on MgSO4 for cHTN w/ super imposed Pre-eclampsia with severe features  - No clinical signs/symptoms of Mg toxicity   - c/w Mg for completion of 24 hour post partum completion of mg therapy till 2200  - c/w routine post partum care

## 2019-09-13 VITALS — SYSTOLIC BLOOD PRESSURE: 123 MMHG | RESPIRATION RATE: 18 BRPM | DIASTOLIC BLOOD PRESSURE: 84 MMHG | HEART RATE: 72 BPM

## 2019-09-13 LAB
BASOPHILS # BLD AUTO: 0.02 K/UL — SIGNIFICANT CHANGE UP (ref 0–0.2)
BASOPHILS NFR BLD AUTO: 0.2 % — SIGNIFICANT CHANGE UP (ref 0–2)
CULTURE RESULTS: SIGNIFICANT CHANGE UP
EOSINOPHIL # BLD AUTO: 0.1 K/UL — SIGNIFICANT CHANGE UP (ref 0–0.5)
EOSINOPHIL NFR BLD AUTO: 0.8 % — SIGNIFICANT CHANGE UP (ref 0–6)
HCT VFR BLD CALC: 29.5 % — LOW (ref 34.5–45)
HGB BLD-MCNC: 9.5 G/DL — LOW (ref 11.5–15.5)
IMM GRANULOCYTES NFR BLD AUTO: 0.4 % — SIGNIFICANT CHANGE UP (ref 0–1.5)
LYMPHOCYTES # BLD AUTO: 17.6 % — SIGNIFICANT CHANGE UP (ref 13–44)
LYMPHOCYTES # BLD AUTO: 2.33 K/UL — SIGNIFICANT CHANGE UP (ref 1–3.3)
MCHC RBC-ENTMCNC: 27.5 PG — SIGNIFICANT CHANGE UP (ref 27–34)
MCHC RBC-ENTMCNC: 32.2 GM/DL — SIGNIFICANT CHANGE UP (ref 32–36)
MCV RBC AUTO: 85.3 FL — SIGNIFICANT CHANGE UP (ref 80–100)
MONOCYTES # BLD AUTO: 0.74 K/UL — SIGNIFICANT CHANGE UP (ref 0–0.9)
MONOCYTES NFR BLD AUTO: 5.6 % — SIGNIFICANT CHANGE UP (ref 2–14)
NEUTROPHILS # BLD AUTO: 9.98 K/UL — HIGH (ref 1.8–7.4)
NEUTROPHILS NFR BLD AUTO: 75.4 % — SIGNIFICANT CHANGE UP (ref 43–77)
PLATELET # BLD AUTO: 295 K/UL — SIGNIFICANT CHANGE UP (ref 150–400)
RBC # BLD: 3.46 M/UL — LOW (ref 3.8–5.2)
RBC # FLD: 14.9 % — HIGH (ref 10.3–14.5)
SPECIMEN SOURCE: SIGNIFICANT CHANGE UP
WBC # BLD: 13.22 K/UL — HIGH (ref 3.8–10.5)
WBC # FLD AUTO: 13.22 K/UL — HIGH (ref 3.8–10.5)

## 2019-09-13 PROCEDURE — 85027 COMPLETE CBC AUTOMATED: CPT

## 2019-09-13 PROCEDURE — 82962 GLUCOSE BLOOD TEST: CPT

## 2019-09-13 PROCEDURE — 86703 HIV-1/HIV-2 1 RESULT ANTBDY: CPT

## 2019-09-13 PROCEDURE — 80053 COMPREHEN METABOLIC PANEL: CPT

## 2019-09-13 PROCEDURE — 86850 RBC ANTIBODY SCREEN: CPT

## 2019-09-13 PROCEDURE — 87070 CULTURE OTHR SPECIMN AEROBIC: CPT

## 2019-09-13 PROCEDURE — T1013: CPT

## 2019-09-13 PROCEDURE — 59025 FETAL NON-STRESS TEST: CPT

## 2019-09-13 PROCEDURE — 90686 IIV4 VACC NO PRSV 0.5 ML IM: CPT

## 2019-09-13 PROCEDURE — 86780 TREPONEMA PALLIDUM: CPT

## 2019-09-13 PROCEDURE — 36415 COLL VENOUS BLD VENIPUNCTURE: CPT

## 2019-09-13 PROCEDURE — 59050 FETAL MONITOR W/REPORT: CPT

## 2019-09-13 PROCEDURE — G0463: CPT

## 2019-09-13 PROCEDURE — 90715 TDAP VACCINE 7 YRS/> IM: CPT

## 2019-09-13 PROCEDURE — 88307 TISSUE EXAM BY PATHOLOGIST: CPT

## 2019-09-13 PROCEDURE — 86901 BLOOD TYPING SEROLOGIC RH(D): CPT

## 2019-09-13 PROCEDURE — 87389 HIV-1 AG W/HIV-1&-2 AB AG IA: CPT

## 2019-09-13 PROCEDURE — 86900 BLOOD TYPING SEROLOGIC ABO: CPT

## 2019-09-13 PROCEDURE — 81001 URINALYSIS AUTO W/SCOPE: CPT

## 2019-09-13 RX ORDER — LABETALOL HCL 100 MG
1 TABLET ORAL
Qty: 60 | Refills: 0
Start: 2019-09-13 | End: 2019-10-12

## 2019-09-13 RX ORDER — TETANUS TOXOID, REDUCED DIPHTHERIA TOXOID AND ACELLULAR PERTUSSIS VACCINE, ADSORBED 5; 2.5; 8; 8; 2.5 [IU]/.5ML; [IU]/.5ML; UG/.5ML; UG/.5ML; UG/.5ML
0.5 SUSPENSION INTRAMUSCULAR ONCE
Refills: 0 | Status: COMPLETED | OUTPATIENT
Start: 2019-09-13 | End: 2019-09-13

## 2019-09-13 RX ORDER — DOCUSATE SODIUM 100 MG
1 CAPSULE ORAL
Qty: 30 | Refills: 0
Start: 2019-09-13 | End: 2019-09-22

## 2019-09-13 RX ORDER — IBUPROFEN 200 MG
1 TABLET ORAL
Qty: 28 | Refills: 0
Start: 2019-09-13 | End: 2019-09-19

## 2019-09-13 RX ADMIN — TETANUS TOXOID, REDUCED DIPHTHERIA TOXOID AND ACELLULAR PERTUSSIS VACCINE, ADSORBED 0.5 MILLILITER(S): 5; 2.5; 8; 8; 2.5 SUSPENSION INTRAMUSCULAR at 00:39

## 2019-09-13 RX ADMIN — INFLUENZA VIRUS VACCINE 0.5 MILLILITER(S): 15; 15; 15; 15 SUSPENSION INTRAMUSCULAR at 00:39

## 2019-09-13 RX ADMIN — Medication 100 MILLIGRAM(S): at 10:38

## 2019-09-13 RX ADMIN — Medication 975 MILLIGRAM(S): at 10:36

## 2019-09-13 RX ADMIN — Medication 975 MILLIGRAM(S): at 11:30

## 2019-09-13 NOTE — DISCHARGE NOTE OB - CARE PLAN
Principal Discharge DX:	 (normal spontaneous vaginal delivery)  Goal:	Recovery  Assessment and plan of treatment:	Patient should transition to regular activity level. Resume regular diet. Patient should follow up with her OB for a BP checkup 1 week after discharge from the hospital. Patient should call her doctor sooner if she develops a fever or uncontrolled vaginal bleeding.

## 2019-09-13 NOTE — DISCHARGE NOTE OB - MEDICATION SUMMARY - MEDICATIONS TO TAKE
I will START or STAY ON the medications listed below when I get home from the hospital:    ibuprofen 600 mg oral tablet  -- 1 tab(s) by mouth every 6 hours   -- Do not take this drug if you are pregnant.  It is very important that you take or use this exactly as directed.  Do not skip doses or discontinue unless directed by your doctor.  May cause drowsiness or dizziness.  Obtain medical advice before taking any non-prescription drugs as some may affect the action of this medication.  Take with food or milk.    -- Indication: For pain    labetalol 100 mg oral tablet  -- 1 tab(s) by mouth 2 times a day   -- It is very important that you take or use this exactly as directed.  Do not skip doses or discontinue unless directed by your doctor.  May cause drowsiness.  Alcohol may intensify this effect.  Use care when operating dangerous machinery.  Some non-prescription drugs may aggravate your condition.  Read all labels carefully.  If a warning appears, check with your doctor before taking.    -- Indication: For BP    Colace 100 mg oral capsule  -- 1 cap(s) by mouth 3 times a day   -- Medication should be taken with plenty of water.    -- Indication: For stool softener

## 2019-09-13 NOTE — PROGRESS NOTE ADULT - ASSESSMENT
MeenaluisJen flores is a 34y woman  ; PPD# 2 s/p   -Encourage ambulation and increased activity daily   -Return to regular diet as tolerated  -multimodal pain management for PP pain  -D/C patient home today pending Attending recommendations  -Recommend follow up in 1 week for a blood pressure check.

## 2019-09-13 NOTE — DISCHARGE NOTE OB - PLAN OF CARE
Recovery Patient should transition to regular activity level. Resume regular diet. Patient should follow up with her OB for a BP checkup 1 week after discharge from the hospital. Patient should call her doctor sooner if she develops a fever or uncontrolled vaginal bleeding.

## 2019-09-13 NOTE — DISCHARGE NOTE OB - PATIENT PORTAL LINK FT
You can access the FollowMyHealth Patient Portal offered by [facility] by registering at the following website: [email or web address]. By joining [employer]’s FollowMyHealth portal, you will also be able to view your health information using other applications (apps) compatible with our system.

## 2019-09-13 NOTE — DISCHARGE NOTE OB - CARE PROVIDER_API CALL
Dwayne Yun)  Obstetrics and Gynecology  [address] [place] [address], [address] [place] [address]  Phone: [phone]  Fax: [phone]  Follow Up Time:

## 2019-09-13 NOTE — DISCHARGE NOTE OB - HOSPITAL COURSE
S/P  at 36wk 6d complicated by a 2nd degree laceration, repaired. Patient with hx of cHTN and transaminitis. Post partum course complicated by superimposed preeclampsia with severe feature s/p Mg therapy. Patient BP currently well controlled on labetalol 100 BID.    At the time of discharge patient was tolerating a regular diet, ambulating without assistance, voiding spontaneously and pain was well controlled with PRN medications. Patient aware of plan to follow up with her OB 1 week after discharge for BP check.

## 2019-09-13 NOTE — PROGRESS NOTE ADULT - SUBJECTIVE AND OBJECTIVE BOX
Jen Coronado is a 34y woman  ; PPD# 2 s/p  of a viable female infant, and post partum pre-eclampsia with severe range SBP treated with a combination of magnesium, IV and oral medication.     Subjective:  -  No acute overnight events, denies any problems over night.  - She feels well, pain is well controlled.   - She is ambulating and tolerating a regular diet.   - +Flatus, Patient is voiding without difficulty.   - She denies nausea/vomiting, breathing problems, headache and visual changes.  - Lochia wnl, decreasing daily.   - Breastfeeding without difficulty.    Vital Signs Last 24 Hrs  T(C): 36.5 (13 Sep 2019 00:10), Max: 37 (12 Sep 2019 06:00)  T(F): 97.7 (13 Sep 2019 00:10), Max: 98.6 (12 Sep 2019 06:00)  HR: 76 (13 Sep 2019 00:10) (75 - 92)  BP: 101/60 (13 Sep 2019 00:10) (101/60 - 141/87)  RR: 18 (13 Sep 2019 00:10) (16 - 18)  SpO2: 95% (13 Sep 2019 00:10) (93% - 99%)    Course of magnesium completed, patients BP now well controlled. Will be sent home with daily medication for bp control.     Physical exam:  General: AAOx3, in NAD  Cardio: RRR  Pulm: CTABL, no increased work of breathing   Breast: not engorged, no erythema   Abdomen: soft, nontender, nondistended, firm uterine fundus.  Pelvic: Normal lochia noted   Ext: No DVT signs, warm extremities, no edema.    Allergies    No Known Allergies    Intolerances        LABS:                        9.5    13.22 )-----------( 295      ( 13 Sep 2019 04:52 )             29.5

## 2019-09-16 ENCOUNTER — APPOINTMENT (OUTPATIENT)
Dept: ANTEPARTUM | Facility: CLINIC | Age: 34
End: 2019-09-16

## 2019-09-16 ENCOUNTER — APPOINTMENT (OUTPATIENT)
Dept: MATERNAL FETAL MEDICINE | Facility: CLINIC | Age: 34
End: 2019-09-16

## 2019-09-18 LAB — SURGICAL PATHOLOGY STUDY: SIGNIFICANT CHANGE UP

## 2019-09-23 ENCOUNTER — APPOINTMENT (OUTPATIENT)
Dept: ANTEPARTUM | Facility: CLINIC | Age: 34
End: 2019-09-23

## 2019-10-31 PROCEDURE — 76815 OB US LIMITED FETUS(S): CPT

## 2019-10-31 PROCEDURE — G0463: CPT

## 2019-10-31 PROCEDURE — G0378: CPT

## 2019-10-31 PROCEDURE — 85027 COMPLETE CBC AUTOMATED: CPT

## 2019-10-31 PROCEDURE — 84156 ASSAY OF PROTEIN URINE: CPT

## 2019-10-31 PROCEDURE — 81001 URINALYSIS AUTO W/SCOPE: CPT

## 2019-10-31 PROCEDURE — 80053 COMPREHEN METABOLIC PANEL: CPT

## 2019-10-31 PROCEDURE — 76819 FETAL BIOPHYS PROFIL W/O NST: CPT

## 2019-10-31 PROCEDURE — 36415 COLL VENOUS BLD VENIPUNCTURE: CPT

## 2019-10-31 PROCEDURE — 93975 VASCULAR STUDY: CPT

## 2019-10-31 PROCEDURE — 82570 ASSAY OF URINE CREATININE: CPT

## 2019-10-31 PROCEDURE — 59025 FETAL NON-STRESS TEST: CPT

## 2021-12-02 NOTE — OB RN PATIENT PROFILE - PRO HBSAG INFANT
12/2/21    Lay Nesbitt  1979    Chief Complaint   Patient presents with    3 Month Follow-Up       History of Present Illness:  Lay Nesbitt is a 38 y.o. pleasant lady presenting today with a chief complaint of smoking, CTS, heartburn. She has a past medical history significant for:  HL (,  on 8/26/2021) ASCVD 2%   Carpal tunnel syndrome  Anxiety, on Buspar 5mg QD PRN   Insomnia, on Hydroxyzine 12.5mg QHS PRN    Obesity (BMI 33)   S/p tubal ligation   Current smoker      # Patient has been having high BP intermittently at other physician offices and at home. Not persistent. Since Aug 2021 she bought new BP kit and BP at home has been 120's/80's. BP today 124/80. # Seeing Ortho for CTS. Had injections done. Had EMG done. # Smokes tobacco. Wanting to quit. Has tried patches and Wellbutrin. Only tried Wellbutrin for < 4 weeks and did not help much because she stopped it on her own as she had a sinus infection and stopped her meds at the time. # Has anxiety. Off Prozac. Taking Buspar. Got it from OBGYN. # Does have difficulty sleeping. Melatonin does not help. Hydroxyzine has been helping. # Having heartburn. Trying to adjust diet. TUMS has not helped.        Health maintenance:   Health Maintenance Due   Topic Date Due    Hepatitis C screen  Never done    COVID-19 Vaccine (1) Never done    Pneumococcal 0-64 years Vaccine (1 of 2 - PPSV23) Never done    HIV screen  Never done    DTaP/Tdap/Td vaccine (1 - Tdap) Never done    Cervical cancer screen  Never done    Flu vaccine (1) Never done         Review of Systems:  Constitutional: no fevers, no chills, no night sweats, no weight loss, no weight gain, no fatigue   Pain assessment: bilateral CTS   Head: no headaches  Ears: no hearing loss, no tinnitus, no vertigo  Eyes: no blurry vision, no diplopia, no dryness, no itchiness  Mouth: no oral ulcers, no dry mouth, no sore throat  Nose: no nasal congestion, no epistaxis  Cardiac: no chest pain, no palpitations, no leg swelling, no orthopnea, no PND, no syncope  Pulmonary: no dyspnea, no cough, no wheezing, no hemoptysis  GI: no nausea, no vomiting, no diarrhea, no constipation, no abdominal pain, no hematochezia  : no dysuria, no frequency, no urgency, no hematuria, no frothy urine, no dyspareunia, no pelvic pain, no vaginal bleeding, no abnormal vaginal discharge  MSK: no Raynaud's   Neuro: no focal neurological deficits, no seizures  Sleep: no snoring, no daytime somnolence   Psych: anxiety, insomnia      Physical Exam:  VITALS:   /80 (Site: Left Upper Arm, Position: Sitting, Cuff Size: Large Adult)   Pulse 68   Resp 18   Ht 5' 5\" (1.651 m)   Wt 198 lb 6.4 oz (90 kg)   SpO2 95%   BMI 33.02 kg/m²     PHYSICAL EXAMINATION:  General: alert, awake, and oriented to time, place, person, and situation. Not in acute distress   Skin:  no suspicious rashes, no jaundice  Head: normocephalic/atraumatic  Eyes: anicteric sclera, well-injected conjunctiva. Pupils are equally round and reactive to light. Extraocular movements are intact   Nose: no septal deviation evident  Sinuses: no sinus tenderness  Ears: external ears normal  Neck: supple, no cervical lymphadenopathy, thyroid symmetric and not enlarged, no bruits   Heart: regular rate and rhythm, regular S1/S2, no S3/S4, no audible murmurs, no audible friction rub  Lungs: clear to auscultation bilaterally, no audible crackles, no audible wheezes, no audible rhonchi    Abdomen: normal bowel sounds, soft abdomen, non-tender, no palpable masses  Extremities: no edema, warm, no cyanosis, no clubbing. Good capillary refill   MSK: no tenderness across spinous processes, full ROM in all 4 extremities.  No joint swelling or tenderness   Peripheral vascular: 2+ pulses symmetric (radial)  Neuro: gait normal, CN II-XII intact, motor power 5/5 in all 4 extremities, sensation intact and symmetric    Labs   I have personally reviewed labs, and discussed pertinent findings with patient on this date 12/2/2021     Imaging   I have personally reviewed imaging, and discussed pertinent findings with patient on this date 12/2/2021     Other notes  I have personally reviewed other notes, and discussed pertinent findings with patient on this date 12/2/2021       Assessment/Plan:     1. Bilateral carpal tunnel syndrome  Getting injections  Not wanting surgery yet     2. Tobacco use  Smoking cessation strongly encouraged  Re-introduce Wellbutrin   - buPROPion (WELLBUTRIN SR) 150 MG extended release tablet; Take 1 tablet by mouth once daily in the morning for 2 weeks followed by twice daily  Dispense: 60 tablet; Refill: 3    3. Anxiety  Continue Buspar 5mg QD PRN    4. Primary insomnia  Continue Hydroxyzine 12.5mg QHS PRN     5. Heartburn  Discussed dietary restrictions  Start with OTC Pepcid, if no improvement then OTC PPI  If no improvement, will consider high dose PPI x8 weeks +/- EGD       Care discussed with patient and questions answered. Patient verbalizes understanding and agrees with plan. Discussed with patient the importance of continuity of care. I encouraged patient to schedule next appointment within 6 months with me. Patient prefers to be reached by Phone call at [phone] for future medical correspondence. Encouraged to activate [employer]. I reviewed and reconciled the medications this visit. I reviewed and updated the past medical, surgical, social, and family history during this visit. After visit summary provided.        Yamilka Amezcua MD  Internal Medicine  12/2/2021   10:56 AM negative

## 2022-04-15 ENCOUNTER — EMERGENCY (EMERGENCY)
Facility: HOSPITAL | Age: 37
LOS: 1 days | Discharge: HOME | End: 2022-04-15
Attending: EMERGENCY MEDICINE
Payer: MEDICAID

## 2022-04-15 VITALS
HEIGHT: 66 IN | WEIGHT: 240.08 LBS | HEART RATE: 87 BPM | SYSTOLIC BLOOD PRESSURE: 114 MMHG | OXYGEN SATURATION: 97 % | DIASTOLIC BLOOD PRESSURE: 71 MMHG | TEMPERATURE: 98 F | RESPIRATION RATE: 18 BRPM

## 2022-04-15 DIAGNOSIS — Z98.890 OTHER SPECIFIED POSTPROCEDURAL STATES: Chronic | ICD-10-CM

## 2022-04-15 DIAGNOSIS — Z90.49 ACQUIRED ABSENCE OF OTHER SPECIFIED PARTS OF DIGESTIVE TRACT: Chronic | ICD-10-CM

## 2022-04-15 DIAGNOSIS — O03.9 COMPLETE OR UNSPECIFIED SPONTANEOUS ABORTION WITHOUT COMPLICATION: Chronic | ICD-10-CM

## 2022-04-15 LAB
ALBUMIN SERPL ELPH-MCNC: 4.1 G/DL — SIGNIFICANT CHANGE UP (ref 3.3–5.2)
ALP SERPL-CCNC: 147 U/L — HIGH (ref 40–120)
ALT FLD-CCNC: 72 U/L — HIGH
ANION GAP SERPL CALC-SCNC: 13 MMOL/L — SIGNIFICANT CHANGE UP (ref 5–17)
AST SERPL-CCNC: 56 U/L — HIGH
BASOPHILS # BLD AUTO: 0.02 K/UL — SIGNIFICANT CHANGE UP (ref 0–0.2)
BASOPHILS NFR BLD AUTO: 0.2 % — SIGNIFICANT CHANGE UP (ref 0–2)
BILIRUB SERPL-MCNC: 0.2 MG/DL — LOW (ref 0.4–2)
BUN SERPL-MCNC: 8.2 MG/DL — SIGNIFICANT CHANGE UP (ref 8–20)
CALCIUM SERPL-MCNC: 9.2 MG/DL — SIGNIFICANT CHANGE UP (ref 8.6–10.2)
CHLORIDE SERPL-SCNC: 100 MMOL/L — SIGNIFICANT CHANGE UP (ref 98–107)
CK SERPL-CCNC: 79 U/L — SIGNIFICANT CHANGE UP (ref 25–170)
CO2 SERPL-SCNC: 23 MMOL/L — SIGNIFICANT CHANGE UP (ref 22–29)
CREAT SERPL-MCNC: 0.42 MG/DL — LOW (ref 0.5–1.3)
EGFR: 128 ML/MIN/1.73M2 — SIGNIFICANT CHANGE UP
EOSINOPHIL # BLD AUTO: 0.15 K/UL — SIGNIFICANT CHANGE UP (ref 0–0.5)
EOSINOPHIL NFR BLD AUTO: 1.6 % — SIGNIFICANT CHANGE UP (ref 0–6)
GLUCOSE SERPL-MCNC: 226 MG/DL — HIGH (ref 70–99)
HCG SERPL-ACNC: <4 MIU/ML — SIGNIFICANT CHANGE UP
HCT VFR BLD CALC: 38.8 % — SIGNIFICANT CHANGE UP (ref 34.5–45)
HGB BLD-MCNC: 12.6 G/DL — SIGNIFICANT CHANGE UP (ref 11.5–15.5)
IMM GRANULOCYTES NFR BLD AUTO: 0.3 % — SIGNIFICANT CHANGE UP (ref 0–1.5)
LYMPHOCYTES # BLD AUTO: 2.27 K/UL — SIGNIFICANT CHANGE UP (ref 1–3.3)
LYMPHOCYTES # BLD AUTO: 24.9 % — SIGNIFICANT CHANGE UP (ref 13–44)
MCHC RBC-ENTMCNC: 25.4 PG — LOW (ref 27–34)
MCHC RBC-ENTMCNC: 32.5 GM/DL — SIGNIFICANT CHANGE UP (ref 32–36)
MCV RBC AUTO: 78.1 FL — LOW (ref 80–100)
MONOCYTES # BLD AUTO: 0.62 K/UL — SIGNIFICANT CHANGE UP (ref 0–0.9)
MONOCYTES NFR BLD AUTO: 6.8 % — SIGNIFICANT CHANGE UP (ref 2–14)
NEUTROPHILS # BLD AUTO: 6.03 K/UL — SIGNIFICANT CHANGE UP (ref 1.8–7.4)
NEUTROPHILS NFR BLD AUTO: 66.2 % — SIGNIFICANT CHANGE UP (ref 43–77)
PLATELET # BLD AUTO: 339 K/UL — SIGNIFICANT CHANGE UP (ref 150–400)
POTASSIUM SERPL-MCNC: 4.1 MMOL/L — SIGNIFICANT CHANGE UP (ref 3.5–5.3)
POTASSIUM SERPL-SCNC: 4.1 MMOL/L — SIGNIFICANT CHANGE UP (ref 3.5–5.3)
PROT SERPL-MCNC: 7.4 G/DL — SIGNIFICANT CHANGE UP (ref 6.6–8.7)
RBC # BLD: 4.97 M/UL — SIGNIFICANT CHANGE UP (ref 3.8–5.2)
RBC # FLD: 13.5 % — SIGNIFICANT CHANGE UP (ref 10.3–14.5)
SODIUM SERPL-SCNC: 136 MMOL/L — SIGNIFICANT CHANGE UP (ref 135–145)
TROPONIN T SERPL-MCNC: <0.01 NG/ML — SIGNIFICANT CHANGE UP (ref 0–0.06)
WBC # BLD: 9.12 K/UL — SIGNIFICANT CHANGE UP (ref 3.8–10.5)
WBC # FLD AUTO: 9.12 K/UL — SIGNIFICANT CHANGE UP (ref 3.8–10.5)

## 2022-04-15 PROCEDURE — 82550 ASSAY OF CK (CPK): CPT

## 2022-04-15 PROCEDURE — 99285 EMERGENCY DEPT VISIT HI MDM: CPT | Mod: 25

## 2022-04-15 PROCEDURE — 84702 CHORIONIC GONADOTROPIN TEST: CPT

## 2022-04-15 PROCEDURE — 71046 X-RAY EXAM CHEST 2 VIEWS: CPT

## 2022-04-15 PROCEDURE — 96374 THER/PROPH/DIAG INJ IV PUSH: CPT

## 2022-04-15 PROCEDURE — 93005 ELECTROCARDIOGRAM TRACING: CPT

## 2022-04-15 PROCEDURE — 93010 ELECTROCARDIOGRAM REPORT: CPT

## 2022-04-15 PROCEDURE — 99285 EMERGENCY DEPT VISIT HI MDM: CPT

## 2022-04-15 PROCEDURE — 36415 COLL VENOUS BLD VENIPUNCTURE: CPT

## 2022-04-15 PROCEDURE — 84484 ASSAY OF TROPONIN QUANT: CPT

## 2022-04-15 PROCEDURE — 71046 X-RAY EXAM CHEST 2 VIEWS: CPT | Mod: 26

## 2022-04-15 PROCEDURE — 85025 COMPLETE CBC W/AUTO DIFF WBC: CPT

## 2022-04-15 PROCEDURE — 80053 COMPREHEN METABOLIC PANEL: CPT

## 2022-04-15 RX ORDER — KETOROLAC TROMETHAMINE 30 MG/ML
15 SYRINGE (ML) INJECTION ONCE
Refills: 0 | Status: DISCONTINUED | OUTPATIENT
Start: 2022-04-15 | End: 2022-04-15

## 2022-04-15 RX ORDER — SODIUM CHLORIDE 9 MG/ML
3 INJECTION INTRAMUSCULAR; INTRAVENOUS; SUBCUTANEOUS ONCE
Refills: 0 | Status: COMPLETED | OUTPATIENT
Start: 2022-04-15 | End: 2022-04-15

## 2022-04-15 RX ORDER — METHOCARBAMOL 500 MG/1
1 TABLET, FILM COATED ORAL
Qty: 20 | Refills: 0
Start: 2022-04-15 | End: 2022-04-19

## 2022-04-15 RX ORDER — METHOCARBAMOL 500 MG/1
1000 TABLET, FILM COATED ORAL ONCE
Refills: 0 | Status: COMPLETED | OUTPATIENT
Start: 2022-04-15 | End: 2022-04-15

## 2022-04-15 RX ADMIN — Medication 15 MILLIGRAM(S): at 06:02

## 2022-04-15 RX ADMIN — Medication 15 MILLIGRAM(S): at 05:38

## 2022-04-15 RX ADMIN — SODIUM CHLORIDE 3 MILLILITER(S): 9 INJECTION INTRAMUSCULAR; INTRAVENOUS; SUBCUTANEOUS at 05:41

## 2022-04-15 RX ADMIN — METHOCARBAMOL 1000 MILLIGRAM(S): 500 TABLET, FILM COATED ORAL at 06:26

## 2022-04-15 NOTE — ED ADULT NURSE NOTE - OBJECTIVE STATEMENT
Patient A&Ox3, complaining of stabbing pain in her chest radiating to her neck and back. Patient states the pain is 9/10. Patient denies SOB, dizziness, nausea, or vomiting. Patient's respirations are even and unlabored, VSS, no signs of distress noted, patient placed on cardiac and pulse oximetry monitoring. Patient A&Ox3, complaining of stabbing pain in her chest radiating to her neck and back. Patient states the pain is 9/10. Patient denies SOB, dizziness, nausea, or vomiting. Patient's respirations are even and unlabored, VSS, no signs of distress noted, patient placed on cardiac and pulse oximetry monitoring. Emergency Department  Deuce used for assessment.

## 2022-04-15 NOTE — ED PROVIDER NOTE - NSICDXPASTMEDICALHX_GEN_ALL_CORE_FT
PAST MEDICAL HISTORY:  Hypertension     Insulin controlled gestational diabetes mellitus (GDM) during pregnancy, antepartum     Iron deficiency anemia, unspecified iron deficiency anemia type

## 2022-04-15 NOTE — ED PROVIDER NOTE - OBJECTIVE STATEMENT
36 yo F with hx DM and HTN  presents to ED c/o L sided chest pain x last 2 days.  Pain worse with movement with radiation to L shoulder .  Pt states she had recent blood work in [facility] and told she had elevated WBC.  Pt denies any assoc SOB, abd pain, N/V, diaphoresis or syncope.  + family hx of CAD (mother).

## 2022-04-15 NOTE — ED PROVIDER NOTE - PROGRESS NOTE DETAILS
Labs as noted and results reviewed with pt.,  CXR DAMIEN. Pt improved with IV Toradol and will d.c on Anaprox DS and Robaxin with f/u in [facility] as outpt

## 2022-04-15 NOTE — ED ADULT TRIAGE NOTE - CHIEF COMPLAINT QUOTE
patient states for 2 days that she has been having chest pain radiating to neck and back, denies cardiac HX

## 2022-04-15 NOTE — ED PROVIDER NOTE - NSICDXPASTSURGICALHX_GEN_ALL_CORE_FT
PAST SURGICAL HISTORY:  Complete spontaneous      History of cholecystectomy 2014    History of colposcopy

## 2022-04-15 NOTE — ED PROVIDER NOTE - CLINICAL SUMMARY MEDICAL DECISION MAKING FREE TEXT BOX
ECG NSR @ 94 bpm, with no acute changes.  pain reproducible with movement .  With hx will check labs, CE, CXR and re-eval after IV Toradol

## 2022-04-15 NOTE — ED PROVIDER NOTE - PATIENT PORTAL LINK FT
You can access the FollowMyHealth Patient Portal offered by [facility] by registering at the following website: [email or web address]. By joining [employer]’s FollowMyHealth portal, you will also be able to view your health information using other applications (apps) compatible with our system.

## 2022-04-15 NOTE — ED PROVIDER NOTE - NSFOLLOWUPINSTRUCTIONS_ED_ALL_ED_FT
Anaprox Ds 2x daily with food as needed for pain  Robaxin 500 mg 4x daily for muscle spasms  Follow up in [facility] clinic next 2-3 days   Return sooner for any problems      Chest Pain    Chest pain can be caused by many different conditions which may or may not be dangerous. Causes include heartburn, lung infections, heart attack, blood clot in lungs, skin infections, strain or damage to muscle, cartilage, or bones, etc. In addition to a history and physical examination, an electrocardiogram (ECG) or other lab tests may have been performed to determine the cause of your chest pain. Follow up with your primary care provider or with a cardiologist as instructed.     SEEK IMMEDIATE MEDICAL CARE IF YOU HAVE ANY OF THE FOLLOWING SYMPTOMS: worsening chest pain, coughing up blood, unexplained back/neck/jaw pain, severe abdominal pain, dizziness or lightheadedness, fainting, shortness of breath, sweaty or clammy skin, vomiting, or racing heart beat. These symptoms may represent a serious problem that is an emergency. Do not wait to see if the symptoms will go away. Get medical help right away. Call 911 and do not drive yourself to the hospital.

## 2022-08-09 ENCOUNTER — EMERGENCY (EMERGENCY)
Facility: HOSPITAL | Age: 37
LOS: 1 days | Discharge: HOME | End: 2022-08-09
Attending: STUDENT IN AN ORGANIZED HEALTH CARE EDUCATION/TRAINING PROGRAM
Payer: MEDICAID

## 2022-08-09 VITALS
TEMPERATURE: 99 F | SYSTOLIC BLOOD PRESSURE: 132 MMHG | DIASTOLIC BLOOD PRESSURE: 83 MMHG | OXYGEN SATURATION: 97 % | HEART RATE: 97 BPM | WEIGHT: 251.55 LBS | RESPIRATION RATE: 18 BRPM | HEIGHT: 66 IN

## 2022-08-09 DIAGNOSIS — Z98.890 OTHER SPECIFIED POSTPROCEDURAL STATES: Chronic | ICD-10-CM

## 2022-08-09 DIAGNOSIS — O03.9 COMPLETE OR UNSPECIFIED SPONTANEOUS ABORTION WITHOUT COMPLICATION: Chronic | ICD-10-CM

## 2022-08-09 DIAGNOSIS — Z90.49 ACQUIRED ABSENCE OF OTHER SPECIFIED PARTS OF DIGESTIVE TRACT: Chronic | ICD-10-CM

## 2022-08-09 LAB
ALBUMIN SERPL ELPH-MCNC: 4.1 G/DL — SIGNIFICANT CHANGE UP (ref 3.3–5.2)
ALP SERPL-CCNC: 156 U/L — HIGH (ref 40–120)
ALT FLD-CCNC: 141 U/L — HIGH
ANION GAP SERPL CALC-SCNC: 14 MMOL/L — SIGNIFICANT CHANGE UP (ref 5–17)
APPEARANCE UR: CLEAR — SIGNIFICANT CHANGE UP
AST SERPL-CCNC: 169 U/L — HIGH
BACTERIA # UR AUTO: ABNORMAL
BASOPHILS # BLD AUTO: 0.02 K/UL — SIGNIFICANT CHANGE UP (ref 0–0.2)
BASOPHILS NFR BLD AUTO: 0.2 % — SIGNIFICANT CHANGE UP (ref 0–2)
BILIRUB SERPL-MCNC: 0.3 MG/DL — LOW (ref 0.4–2)
BILIRUB UR-MCNC: NEGATIVE — SIGNIFICANT CHANGE UP
BUN SERPL-MCNC: 7.9 MG/DL — LOW (ref 8–20)
CALCIUM SERPL-MCNC: 9.5 MG/DL — SIGNIFICANT CHANGE UP (ref 8.4–10.5)
CHLORIDE SERPL-SCNC: 99 MMOL/L — SIGNIFICANT CHANGE UP (ref 98–107)
CO2 SERPL-SCNC: 21 MMOL/L — LOW (ref 22–29)
COLOR SPEC: YELLOW — SIGNIFICANT CHANGE UP
CREAT SERPL-MCNC: 0.48 MG/DL — LOW (ref 0.5–1.3)
DIFF PNL FLD: ABNORMAL
EGFR: 123 ML/MIN/1.73M2 — SIGNIFICANT CHANGE UP
EOSINOPHIL # BLD AUTO: 0.14 K/UL — SIGNIFICANT CHANGE UP (ref 0–0.5)
EOSINOPHIL NFR BLD AUTO: 1.4 % — SIGNIFICANT CHANGE UP (ref 0–6)
EPI CELLS # UR: SIGNIFICANT CHANGE UP
GLUCOSE SERPL-MCNC: 154 MG/DL — HIGH (ref 70–99)
GLUCOSE UR QL: 1000 MG/DL
HCG SERPL-ACNC: 7027 MIU/ML — HIGH
HCT VFR BLD CALC: 41.8 % — SIGNIFICANT CHANGE UP (ref 34.5–45)
HGB BLD-MCNC: 13.7 G/DL — SIGNIFICANT CHANGE UP (ref 11.5–15.5)
IMM GRANULOCYTES NFR BLD AUTO: 0.5 % — SIGNIFICANT CHANGE UP (ref 0–1.5)
KETONES UR-MCNC: ABNORMAL
LEUKOCYTE ESTERASE UR-ACNC: NEGATIVE — SIGNIFICANT CHANGE UP
LYMPHOCYTES # BLD AUTO: 1.99 K/UL — SIGNIFICANT CHANGE UP (ref 1–3.3)
LYMPHOCYTES # BLD AUTO: 19.4 % — SIGNIFICANT CHANGE UP (ref 13–44)
MCHC RBC-ENTMCNC: 25.8 PG — LOW (ref 27–34)
MCHC RBC-ENTMCNC: 32.8 GM/DL — SIGNIFICANT CHANGE UP (ref 32–36)
MCV RBC AUTO: 78.7 FL — LOW (ref 80–100)
MONOCYTES # BLD AUTO: 0.66 K/UL — SIGNIFICANT CHANGE UP (ref 0–0.9)
MONOCYTES NFR BLD AUTO: 6.4 % — SIGNIFICANT CHANGE UP (ref 2–14)
NEUTROPHILS # BLD AUTO: 7.39 K/UL — SIGNIFICANT CHANGE UP (ref 1.8–7.4)
NEUTROPHILS NFR BLD AUTO: 72.1 % — SIGNIFICANT CHANGE UP (ref 43–77)
NITRITE UR-MCNC: NEGATIVE — SIGNIFICANT CHANGE UP
PH UR: 5 — SIGNIFICANT CHANGE UP (ref 5–8)
PLATELET # BLD AUTO: 335 K/UL — SIGNIFICANT CHANGE UP (ref 150–400)
POTASSIUM SERPL-MCNC: 4 MMOL/L — SIGNIFICANT CHANGE UP (ref 3.5–5.3)
POTASSIUM SERPL-SCNC: 4 MMOL/L — SIGNIFICANT CHANGE UP (ref 3.5–5.3)
PROT SERPL-MCNC: 7.6 G/DL — SIGNIFICANT CHANGE UP (ref 6.6–8.7)
PROT UR-MCNC: 15
RBC # BLD: 5.31 M/UL — HIGH (ref 3.8–5.2)
RBC # FLD: 14.7 % — HIGH (ref 10.3–14.5)
RBC CASTS # UR COMP ASSIST: SIGNIFICANT CHANGE UP /HPF (ref 0–4)
SODIUM SERPL-SCNC: 134 MMOL/L — LOW (ref 135–145)
SP GR SPEC: 1.02 — SIGNIFICANT CHANGE UP (ref 1.01–1.02)
TROPONIN T SERPL-MCNC: <0.01 NG/ML — SIGNIFICANT CHANGE UP (ref 0–0.06)
UROBILINOGEN FLD QL: NEGATIVE MG/DL — SIGNIFICANT CHANGE UP
WBC # BLD: 10.25 K/UL — SIGNIFICANT CHANGE UP (ref 3.8–10.5)
WBC # FLD AUTO: 10.25 K/UL — SIGNIFICANT CHANGE UP (ref 3.8–10.5)
WBC UR QL: SIGNIFICANT CHANGE UP /HPF (ref 0–5)

## 2022-08-09 PROCEDURE — 99285 EMERGENCY DEPT VISIT HI MDM: CPT | Mod: 25

## 2022-08-09 PROCEDURE — 71045 X-RAY EXAM CHEST 1 VIEW: CPT

## 2022-08-09 PROCEDURE — 81001 URINALYSIS AUTO W/SCOPE: CPT

## 2022-08-09 PROCEDURE — 85025 COMPLETE CBC W/AUTO DIFF WBC: CPT

## 2022-08-09 PROCEDURE — 76817 TRANSVAGINAL US OBSTETRIC: CPT | Mod: 26

## 2022-08-09 PROCEDURE — 76817 TRANSVAGINAL US OBSTETRIC: CPT

## 2022-08-09 PROCEDURE — 84484 ASSAY OF TROPONIN QUANT: CPT

## 2022-08-09 PROCEDURE — 99285 EMERGENCY DEPT VISIT HI MDM: CPT

## 2022-08-09 PROCEDURE — 93005 ELECTROCARDIOGRAM TRACING: CPT

## 2022-08-09 PROCEDURE — 84702 CHORIONIC GONADOTROPIN TEST: CPT

## 2022-08-09 PROCEDURE — 93010 ELECTROCARDIOGRAM REPORT: CPT

## 2022-08-09 PROCEDURE — 87086 URINE CULTURE/COLONY COUNT: CPT

## 2022-08-09 PROCEDURE — 80053 COMPREHEN METABOLIC PANEL: CPT

## 2022-08-09 PROCEDURE — 76801 OB US < 14 WKS SINGLE FETUS: CPT | Mod: 26

## 2022-08-09 PROCEDURE — 76801 OB US < 14 WKS SINGLE FETUS: CPT

## 2022-08-09 PROCEDURE — 36415 COLL VENOUS BLD VENIPUNCTURE: CPT

## 2022-08-09 PROCEDURE — 96374 THER/PROPH/DIAG INJ IV PUSH: CPT

## 2022-08-09 PROCEDURE — 71045 X-RAY EXAM CHEST 1 VIEW: CPT | Mod: 26

## 2022-08-09 RX ORDER — ACETAMINOPHEN 500 MG
1000 TABLET ORAL ONCE
Refills: 0 | Status: COMPLETED | OUTPATIENT
Start: 2022-08-09 | End: 2022-08-09

## 2022-08-09 RX ORDER — SODIUM CHLORIDE 9 MG/ML
1000 INJECTION INTRAMUSCULAR; INTRAVENOUS; SUBCUTANEOUS ONCE
Refills: 0 | Status: COMPLETED | OUTPATIENT
Start: 2022-08-09 | End: 2022-08-09

## 2022-08-09 RX ADMIN — SODIUM CHLORIDE 1000 MILLILITER(S): 9 INJECTION INTRAMUSCULAR; INTRAVENOUS; SUBCUTANEOUS at 15:53

## 2022-08-09 RX ADMIN — Medication 400 MILLIGRAM(S): at 15:53

## 2022-08-09 NOTE — ED STATDOCS - NSFOLLOWUPINSTRUCTIONS_ED_ALL_ED_FT
- Follow up with PCP  - Follow up with OBGYN    Headache    A headache is pain or discomfort felt around the head or neck area. The specific cause of a headache may not be found as there are many types including tension headaches, migraine headaches, and cluster headaches. Watch your condition for any changes. Things you can do to manage your pain include taking over the counter and prescription medications as instructed by your health care provider, lying down in a dark quiet room, limiting stress, getting regular sleep, and refraining from alcohol and tobacco products.    SEEK IMMEDIATE MEDICAL CARE IF YOU HAVE ANY OF THE FOLLOWING SYMPTOMS: fever, vomiting, stiff neck, loss of vision, problems with speech, muscle weakness, loss of balance, trouble walking, passing out, or confusion.

## 2022-08-09 NOTE — ED STATDOCS - NS ED ROS FT
Constitutional: no fever, no chills  Eyes: no vision changes  ENT: no nasal congestion, no sore throat  CV: +chest pressure  Resp: no cough, no shortness of breath  GI: +right sided abdominal pain, no vomiting, no diarrhea  : no dysuria  MSK: no joint pain  Skin: no rash  Neuro: +headache, no focal weakness, no paresthesias Constitutional: no fever, no chills  Eyes: no vision changes  ENT: no nasal congestion, no sore throat  CV: +chest pressure  Resp: no cough, +shortness of breath  GI: +right sided abdominal pain, no nausea, no vomiting, no diarrhea  : no dysuria, no vaginal bleeding or discharge  MSK: no joint pain  Skin: no rash  Neuro: +headache, no focal weakness, no paresthesias

## 2022-08-09 NOTE — ED ADULT TRIAGE NOTE - CHIEF COMPLAINT QUOTE
Patient arrived to ED today with c/o headaches since yesterday and she reports that she is pregnant LMP was 5/8.

## 2022-08-09 NOTE — ED STATDOCS - CLINICAL SUMMARY MEDICAL DECISION MAKING FREE TEXT BOX
37 year old female  presents with gradual worsening headache with no neurological deficits on exam, also c/o chest discomfort with no associated symptoms. Discussed right sided abdominal discomfort with nontender abdomen .Low suspicion for intercranial abnormality or ACS or ectopic. Upon exam will consider and workup with EKG, blood work, urine, US and XR. Will give pain medication fluids, and reassess. 37 year old female  presents with gradual worsening headache with no neurological deficits on exam, also c/o chest discomfort with no associated symptoms. Discussed right sided abdominal discomfort with nontender abdomen .Low suspicion for intracranial abnormality or ACS or ectopic based upon exam. but will consider and workup with EKG, blood work, urine, US and XR. Will give pain medication fluids, and reassess. Will require outpt f/u with OB and PCP.

## 2022-08-09 NOTE — ED STATDOCS - OBJECTIVE STATEMENT
36 y/o female  with PMHx of preeclampsia, HTN and DM presents with a constant worsening headache since yesterday at 5. At 5am today, she reports having a head and chest pressure feeling with associated intermittent mild shortness of breath. Pt states she cannot stand the pain. She is scared to take anything from the baby. She also reports having right sided abdominal pain since 2am today. Denies weakness in LE, changes in vision, n/v, vaginal bleeding ir discharge, dysuria, cough, fever or chills. She does not have an OB doctor, states she goes to the clinic, where she found out she was pregnant about a week and a half ago.  : Wilfrid  LMP: May 14th. 36 y/o female  LMP 22 with PMHx of preeclampsia, HTN and DM presents with a constant, worsening, pressure like headache at bilateral temples since yesterday at 5pm. At 5am today, she reports having associated nonradiating, nonpleuritic mid chest pressure with associated intermittent mild shortness of breath. Pt states she cannot stand the pain and was scared to take anything because of the baby. She also reports having right sided abdominal pain since 2pm today. Denies weakness in LE, changes in vision, n/v, vaginal bleeding or discharge, dysuria, cough, fever or chills. She does not have an OB doctor, states she goes to the clinic, where she found out she was pregnant about a week and a half ago.  : Wilfrid

## 2022-08-09 NOTE — ED STATDOCS - PHYSICAL EXAMINATION
General: well-appearing, no acute distress, elevated BMI  Head: normocephalic, atraumatic  Eyes: PERRL   Mouth: moist mucous membranes  Neck: supple neck, no lymphadenopathy  CV: normal rate and rhythm, no LE edema, peripheral pulses 2+ bilateral UE and LE  Respiratory: clear to auscultation bilaterally  Abdomen: soft, nondistended, nontender  : no suprapubic tenderness, no CVAT  MSK: no joint deformities  Neuro: alert and oriented x3, speech clear, moving all extremities spontaneously without difficulty  Skin: no rash noted General: well-appearing, no acute distress, elevated BMI  Head: normocephalic, atraumatic  Eyes: PERRL, EOMI  Mouth: moist mucous membranes  Neck: supple neck, no lymphadenopathy  CV: normal rate and rhythm, no LE edema, peripheral pulses 2+ bilateral UE and LE  Respiratory: clear to auscultation bilaterally  Abdomen: soft, nondistended, nontender  : no suprapubic tenderness, no CVAT  MSK: no joint deformities  Neuro: alert and oriented x3, speech clear, CN III-XII intact, 5/5 strength in all extremities  Skin: no rash noted

## 2022-08-09 NOTE — ED STATDOCS - PATIENT PORTAL LINK FT
You can access the FollowMyHealth Patient Portal offered by [facility] by registering at the following website: [email or web address]. By joining [employer]’s FollowMyHealth portal, you will also be able to view your health information using other applications (apps) compatible with our system.

## 2022-08-09 NOTE — ED ADULT NURSE NOTE - OBJECTIVE STATEMENT
Pt in ED d/t headache x2days. Pt reports headache is only in forhead/around eyes area. Also has chest tightness in midsternal area. Denies SOB, dizziness, numbness/tingling, weakness, vision changes. Reports being pregnant, unsure how far she is. Is suppose to get US done to see. Ambulates without any issues. AOx4, GCS 15.

## 2022-08-10 LAB
CULTURE RESULTS: SIGNIFICANT CHANGE UP
SPECIMEN SOURCE: SIGNIFICANT CHANGE UP

## 2022-08-14 ENCOUNTER — EMERGENCY (EMERGENCY)
Facility: HOSPITAL | Age: 37
LOS: 1 days | Discharge: HOME | End: 2022-08-14
Attending: EMERGENCY MEDICINE
Payer: MEDICAID

## 2022-08-14 VITALS
WEIGHT: 229.94 LBS | SYSTOLIC BLOOD PRESSURE: 136 MMHG | DIASTOLIC BLOOD PRESSURE: 83 MMHG | HEART RATE: 98 BPM | HEIGHT: 66 IN | TEMPERATURE: 98 F | RESPIRATION RATE: 18 BRPM | OXYGEN SATURATION: 98 %

## 2022-08-14 DIAGNOSIS — Z98.890 OTHER SPECIFIED POSTPROCEDURAL STATES: Chronic | ICD-10-CM

## 2022-08-14 DIAGNOSIS — Z90.49 ACQUIRED ABSENCE OF OTHER SPECIFIED PARTS OF DIGESTIVE TRACT: Chronic | ICD-10-CM

## 2022-08-14 DIAGNOSIS — O03.9 COMPLETE OR UNSPECIFIED SPONTANEOUS ABORTION WITHOUT COMPLICATION: Chronic | ICD-10-CM

## 2022-08-14 PROCEDURE — 99285 EMERGENCY DEPT VISIT HI MDM: CPT

## 2022-08-14 NOTE — ED ADULT TRIAGE NOTE - HISTORY OF COVID-19 VACCINATION
GI CONSULTATION      Patient is a 77y old  Female who presents with a chief complaint of abdominal pain (30 Sep 2018 23:28)      HPI:  77y old female pmhx below presents to the ED as per daughter complaining of abdominal pain x4days, associated with non-bloody diarrhea x2days several times. pt now with fever, tmax-102. pt denies chest pain, SOB, but admits to decrease po intake (27 Sep 2018 17:46)        PAST MEDICAL & SURGICAL HISTORY:  Anxiety  Sialadenitis  Depression  Rheumatoid arthritis  HTN (hypertension)  Diabetes  Foot mass: left foot mass removal 20 years ago  History of dental surgery      Allergies: No Known Allergies    Medications; acetaminophen   Tablet .. 650 milliGRAM(s) Oral every 6 hours PRN  atorvastatin 10 milliGRAM(s) Oral at bedtime  bismuth subsalicylate Liquid 30 milliLiter(s) Oral every 8 hours PRN  buPROPion XL . 300 milliGRAM(s) Oral daily  cefoTEtan  IVPB 1 Gram(s) IV Intermittent every 12 hours  dextrose 5%. 1000 milliLiter(s) IV Continuous <Continuous>  folic acid 1 milliGRAM(s) Oral daily  insulin lispro (HumaLOG) corrective regimen sliding scale   SubCutaneous three times a day before meals  metroNIDAZOLE  IVPB 500 milliGRAM(s) IV Intermittent every 8 hours  oxyCODONE    IR 5 milliGRAM(s) Oral every 6 hours PRN  sodium chloride 0.9%. 1000 milliLiter(s) IV Continuous <Continuous>  Viibryd 40 milliGRAM(s) 40 milliGRAM(s) Oral daily        Review of systems noncontributory other than as listed in Admission H & P    Physical Examination  T(C): 36 (10-01-18 @ 05:01), Max: 36.8 (09-30-18 @ 21:35)  HR: 71 (10-01-18 @ 06:30) (63 - 77)  BP: 135/62 (10-01-18 @ 06:30) (104/59 - 174/67)  RR: 18 (10-01-18 @ 05:01) (16 - 18)  SpO2: --        HEENT: within normal limits  Lungs: Clear, no wheezes or Rhonchi  Cor: RRR no murmers  Abdomen: Bowel sounds normal, nontender  Extremeties without clubbing cyanosis or edema    Data:                          11.1   4.95  )-----------( 183      ( 01 Oct 2018 08:08 )             35.4     09-30    140  |  102  |  4<L>  ----------------------------<  299<H>  3.2<L>   |  28  |  0.7    Ca    8.1<L>      30 Sep 2018 21:57  Mg     1.5     10-01    TPro  5.1<L>  /  Alb  2.9<L>  /  TBili  <0.2  /  DBili  x   /  AST  17  /  ALT  12  /  AlkPhos  68  09-30    LIVER FUNCTIONS - ( 30 Sep 2018 21:57 )  Alb: 2.9 g/dL / Pro: 5.1 g/dL / ALK PHOS: 68 U/L / ALT: 12 U/L / AST: 17 U/L / GGT: x
Vaccine status unknown

## 2022-08-14 NOTE — ED ADULT TRIAGE NOTE - CHIEF COMPLAINT QUOTE
Patient presents to ED at 5 weeks pregnant c/o vaginal bleeding since yesterday. Reports lower abdominal cramping at this time. Hx of two miscarriages in the past.

## 2022-08-15 LAB
ALBUMIN SERPL ELPH-MCNC: 4.1 G/DL — SIGNIFICANT CHANGE UP (ref 3.3–5.2)
ALP SERPL-CCNC: 180 U/L — HIGH (ref 40–120)
ALT FLD-CCNC: 143 U/L — HIGH
ANION GAP SERPL CALC-SCNC: 14 MMOL/L — SIGNIFICANT CHANGE UP (ref 5–17)
APPEARANCE UR: ABNORMAL
AST SERPL-CCNC: 117 U/L — HIGH
BACTERIA # UR AUTO: ABNORMAL
BASOPHILS # BLD AUTO: 0.02 K/UL — SIGNIFICANT CHANGE UP (ref 0–0.2)
BASOPHILS NFR BLD AUTO: 0.2 % — SIGNIFICANT CHANGE UP (ref 0–2)
BILIRUB SERPL-MCNC: <0.2 MG/DL — LOW (ref 0.4–2)
BILIRUB UR-MCNC: NEGATIVE — SIGNIFICANT CHANGE UP
BLD GP AB SCN SERPL QL: SIGNIFICANT CHANGE UP
BUN SERPL-MCNC: 11.2 MG/DL — SIGNIFICANT CHANGE UP (ref 8–20)
CALCIUM SERPL-MCNC: 9.4 MG/DL — SIGNIFICANT CHANGE UP (ref 8.4–10.5)
CHLORIDE SERPL-SCNC: 101 MMOL/L — SIGNIFICANT CHANGE UP (ref 98–107)
CO2 SERPL-SCNC: 22 MMOL/L — SIGNIFICANT CHANGE UP (ref 22–29)
COLOR SPEC: ABNORMAL
CREAT SERPL-MCNC: 0.43 MG/DL — LOW (ref 0.5–1.3)
DIFF PNL FLD: ABNORMAL
EGFR: 127 ML/MIN/1.73M2 — SIGNIFICANT CHANGE UP
EOSINOPHIL # BLD AUTO: 0.19 K/UL — SIGNIFICANT CHANGE UP (ref 0–0.5)
EOSINOPHIL NFR BLD AUTO: 1.9 % — SIGNIFICANT CHANGE UP (ref 0–6)
EPI CELLS # UR: SIGNIFICANT CHANGE UP
GLUCOSE SERPL-MCNC: 212 MG/DL — HIGH (ref 70–99)
GLUCOSE UR QL: 250 MG/DL
HCG SERPL-ACNC: 7338 MIU/ML — HIGH
HCT VFR BLD CALC: 39.3 % — SIGNIFICANT CHANGE UP (ref 34.5–45)
HGB BLD-MCNC: 13 G/DL — SIGNIFICANT CHANGE UP (ref 11.5–15.5)
IMM GRANULOCYTES NFR BLD AUTO: 0.5 % — SIGNIFICANT CHANGE UP (ref 0–1.5)
KETONES UR-MCNC: ABNORMAL
LEUKOCYTE ESTERASE UR-ACNC: ABNORMAL
LYMPHOCYTES # BLD AUTO: 2.31 K/UL — SIGNIFICANT CHANGE UP (ref 1–3.3)
LYMPHOCYTES # BLD AUTO: 23.7 % — SIGNIFICANT CHANGE UP (ref 13–44)
MCHC RBC-ENTMCNC: 26 PG — LOW (ref 27–34)
MCHC RBC-ENTMCNC: 33.1 GM/DL — SIGNIFICANT CHANGE UP (ref 32–36)
MCV RBC AUTO: 78.6 FL — LOW (ref 80–100)
MONOCYTES # BLD AUTO: 0.66 K/UL — SIGNIFICANT CHANGE UP (ref 0–0.9)
MONOCYTES NFR BLD AUTO: 6.8 % — SIGNIFICANT CHANGE UP (ref 2–14)
NEUTROPHILS # BLD AUTO: 6.53 K/UL — SIGNIFICANT CHANGE UP (ref 1.8–7.4)
NEUTROPHILS NFR BLD AUTO: 66.9 % — SIGNIFICANT CHANGE UP (ref 43–77)
NITRITE UR-MCNC: NEGATIVE — SIGNIFICANT CHANGE UP
PH UR: 6.5 — SIGNIFICANT CHANGE UP (ref 5–8)
PLATELET # BLD AUTO: 373 K/UL — SIGNIFICANT CHANGE UP (ref 150–400)
POTASSIUM SERPL-MCNC: 4 MMOL/L — SIGNIFICANT CHANGE UP (ref 3.5–5.3)
POTASSIUM SERPL-SCNC: 4 MMOL/L — SIGNIFICANT CHANGE UP (ref 3.5–5.3)
PROT SERPL-MCNC: 7.4 G/DL — SIGNIFICANT CHANGE UP (ref 6.6–8.7)
PROT UR-MCNC: NEGATIVE — SIGNIFICANT CHANGE UP
RBC # BLD: 5 M/UL — SIGNIFICANT CHANGE UP (ref 3.8–5.2)
RBC # FLD: 14.6 % — HIGH (ref 10.3–14.5)
RBC CASTS # UR COMP ASSIST: >50 /HPF (ref 0–4)
SODIUM SERPL-SCNC: 137 MMOL/L — SIGNIFICANT CHANGE UP (ref 135–145)
SP GR SPEC: 1.01 — SIGNIFICANT CHANGE UP (ref 1.01–1.02)
UROBILINOGEN FLD QL: NEGATIVE MG/DL — SIGNIFICANT CHANGE UP
WBC # BLD: 9.76 K/UL — SIGNIFICANT CHANGE UP (ref 3.8–10.5)
WBC # FLD AUTO: 9.76 K/UL — SIGNIFICANT CHANGE UP (ref 3.8–10.5)
WBC UR QL: SIGNIFICANT CHANGE UP /HPF (ref 0–5)

## 2022-08-15 PROCEDURE — 76801 OB US < 14 WKS SINGLE FETUS: CPT | Mod: 26

## 2022-08-15 PROCEDURE — 76817 TRANSVAGINAL US OBSTETRIC: CPT

## 2022-08-15 PROCEDURE — 81001 URINALYSIS AUTO W/SCOPE: CPT

## 2022-08-15 PROCEDURE — 76817 TRANSVAGINAL US OBSTETRIC: CPT | Mod: 26

## 2022-08-15 PROCEDURE — 36415 COLL VENOUS BLD VENIPUNCTURE: CPT

## 2022-08-15 PROCEDURE — 85025 COMPLETE CBC W/AUTO DIFF WBC: CPT

## 2022-08-15 PROCEDURE — 76801 OB US < 14 WKS SINGLE FETUS: CPT

## 2022-08-15 PROCEDURE — 86901 BLOOD TYPING SEROLOGIC RH(D): CPT

## 2022-08-15 PROCEDURE — 84702 CHORIONIC GONADOTROPIN TEST: CPT

## 2022-08-15 PROCEDURE — 99284 EMERGENCY DEPT VISIT MOD MDM: CPT | Mod: 25

## 2022-08-15 PROCEDURE — 87086 URINE CULTURE/COLONY COUNT: CPT

## 2022-08-15 PROCEDURE — 80053 COMPREHEN METABOLIC PANEL: CPT

## 2022-08-15 PROCEDURE — 86900 BLOOD TYPING SEROLOGIC ABO: CPT

## 2022-08-15 PROCEDURE — 86850 RBC ANTIBODY SCREEN: CPT

## 2022-08-15 NOTE — ED PROVIDER NOTE - PATIENT PORTAL LINK FT
You can access the FollowMyHealth Patient Portal offered by [facility] by registering at the following website: [email or web address]. By joining [employer]’s FollowMyHealth portal, you will also be able to view your health information using other applications (apps) compatible with our system.

## 2022-08-15 NOTE — ED PROVIDER NOTE - NSFOLLOWUPINSTRUCTIONS_ED_ALL_ED_FT
Log Out.      [employer]® CareNotes®     :  [facility] MISCARRIAGE - Discharge Care           Amenaza de aborto natural    LO QUE NECESITA SABER:    John amenaza de aborto ocurre cuando se tiene sangrado vaginal dentro de las primeras 20 semanas de embarazo. Eso indica que podría ocurrir un aborto natural. John amenaza de un aborto natural también se le conoce sonya john amenaza de pérdida del embarazo.    INSTRUCCIONES SOBRE EL ALVIN HOSPITALARIA:    Busque atención médica de inmediato si:  •Se siente débil o que se desmaya.      •Tiene dolor o cólicos en el abdomen o en la espalda que empeoran.      •Tiene sangrado vaginal que en john hora empapa por lo menos 1 toalla sanitaria.      •Le sale un material que parece tejido o coágulos grandes.      Llame a taveras médico u obstetra si:  •Tiene fiebre.      •Tiene problemas para orinar, siente ardor o la necesidad de orinar con frecuencia.      •Tiene sangrado vaginal nuevo o que empeora.      •Tiene dolor o comezón vaginal o flujo vaginal de color amarillo o christina o maloliente.      •Usted tiene preguntas o inquietudes acerca de taveras condición o cuidado.      Cuidados personales:Los siguientes podrían ayudar a controlar los síntomas y disminuir taveras riesgo de un aborto natural:  •No se ponga nada dentro de taveras vagina.No tenga relaciones sexuales, no tome duchas vaginales ni use tampones. Estas acciones pueden aumentar taveras riesgo de john infección o de un aborto natural.      •Repose según le indicaron.No practique ningún ejercicio ni actividades vigorosas. Estas actividades pueden causar un parto prematuro o un aborto natural. Pregunte a taveras médico cuáles actividades físicas son las apropiadas para usted.      Manténgase saludable trevor el embarazo:  •Consuma alimentos saludables y variados.Los alimentos sanos pueden ayudarla a obtener las proteínas y calorías adicionales que usted necesita trevor el embarazo. Los alimentos saludables incluyen frutas, verduras, pan integral, productos lácteos bajos en grasa, frijoles, armando magras y pescado. Evite la carne y pescado crudos o que no estén cocinados completamente. Consulte con taveras médico en tawanda que sea necesario que siga john dieta especial.  Alimentos saludables           •Fulda vitaminas prenatales según las indicaciones.Estas ayudan a obtener la cantidad correcta de vitaminas y minerales. También podrían ayudar a disminuir el riesgo de ciertos defectos de nacimiento.      •No consuma alcohol ni drogas ilegales.Estos pueden aumentar el riesgo de un aborto espontáneo o perjudicar al bebé.      •No fume.La nicotina y otros químicos en los cigarrillos y cigarros pueden perjudicar a taveras bebé y provocar un aborto natural o un parto prematuro. Pida información a taveras médico si usted actualmente fuma y necesita ayuda para dejar de fumar. Los cigarrillos electrónicos o el tabaco sin humo igualmente contienen nicotina. No use estos productos.      •Disminuya el riesgo de john infección.Siempre lave conner elvis antes de comer o preparar alimentos. No pase tiempo con gente que está enferma. Pregúntele a taveras médico si necesita vacunas sonya la vacuna contra la gripe o la hepatitis B. Las vacunas pueden disminuir taveras riesgo de contraer infecciones que pueden causar un aborto espontáneo.      •Controle conner afecciones médicas.Mantenga bajo control taveras presión arterial y azúcar en la erasmo. Mantenga un peso saludable trevor el embarazo.      Acuda a conner consultas de control con taveras médico u obstetra según le indicaron:Es posible que usted deba acudir a consulta con taveras ginecólogo frecuentemente para que le ordene ecografías o más exámenes de erasmo. Anote conner preguntas para que se acuerde de hacerlas trevor conner visitas.       © Copyright [employer] 2022           back to top                          © Copyright [employer] 2022

## 2022-08-15 NOTE — ED PROVIDER NOTE - OBJECTIVE STATEMENT
patient is a 36 y/o female  currently 6 weeks pregnant presenting to the ed for evaluation. pt states tonight started having heavy vaginal spotting and lower abdominal cramping . pt currently does not have an obgyn. pt states was at [facility] on  had first ultrasound preformed - was told "everything was ok". pt denies cp sob fever nausea vomiting numbness or loss of sensation back pain dysuria

## 2022-08-15 NOTE — ED PROVIDER NOTE - ATTENDING APP SHARED VISIT CONTRIBUTION OF CARE
Lower abdominal cramping and bleeding in early pregnancy, US with small subchorionic otherwise live IUP, return precautions, ob follow up.

## 2022-08-15 NOTE — ED ADULT NURSE NOTE - OBJECTIVE STATEMENT
Pt A&OX4 states she is 5 weeks pregnant and has been recently having abdominal pain with bleeding. pt denies n/v/d updated on plan of care labs obtained awaiting ua and us for next step in plan of care.

## 2022-08-16 LAB
CULTURE RESULTS: SIGNIFICANT CHANGE UP
SPECIMEN SOURCE: SIGNIFICANT CHANGE UP

## 2022-08-17 ENCOUNTER — EMERGENCY (EMERGENCY)
Facility: HOSPITAL | Age: 37
LOS: 1 days | Discharge: HOME | End: 2022-08-17
Attending: EMERGENCY MEDICINE
Payer: MEDICAID

## 2022-08-17 VITALS
OXYGEN SATURATION: 96 % | WEIGHT: 240.08 LBS | HEART RATE: 99 BPM | SYSTOLIC BLOOD PRESSURE: 138 MMHG | TEMPERATURE: 99 F | DIASTOLIC BLOOD PRESSURE: 95 MMHG | HEIGHT: 66 IN | RESPIRATION RATE: 16 BRPM

## 2022-08-17 DIAGNOSIS — Z90.49 ACQUIRED ABSENCE OF OTHER SPECIFIED PARTS OF DIGESTIVE TRACT: Chronic | ICD-10-CM

## 2022-08-17 DIAGNOSIS — O03.9 COMPLETE OR UNSPECIFIED SPONTANEOUS ABORTION WITHOUT COMPLICATION: Chronic | ICD-10-CM

## 2022-08-17 DIAGNOSIS — Z98.890 OTHER SPECIFIED POSTPROCEDURAL STATES: Chronic | ICD-10-CM

## 2022-08-17 LAB
APPEARANCE UR: ABNORMAL
BACTERIA # UR AUTO: ABNORMAL
BILIRUB UR-MCNC: ABNORMAL
COLOR SPEC: ABNORMAL
DIFF PNL FLD: ABNORMAL
EPI CELLS # UR: SIGNIFICANT CHANGE UP
GLUCOSE UR QL: 50 MG/DL
KETONES UR-MCNC: ABNORMAL
LEUKOCYTE ESTERASE UR-ACNC: ABNORMAL
NITRITE UR-MCNC: POSITIVE
PH UR: 5 — SIGNIFICANT CHANGE UP (ref 5–8)
PROT UR-MCNC: 30 MG/DL
RBC CASTS # UR COMP ASSIST: >50 /HPF (ref 0–4)
SP GR SPEC: 1.02 — SIGNIFICANT CHANGE UP (ref 1.01–1.02)
UROBILINOGEN FLD QL: 1
WBC UR QL: ABNORMAL /HPF (ref 0–5)

## 2022-08-17 PROCEDURE — 99285 EMERGENCY DEPT VISIT HI MDM: CPT

## 2022-08-17 RX ORDER — ACETAMINOPHEN 500 MG
650 TABLET ORAL ONCE
Refills: 0 | Status: COMPLETED | OUTPATIENT
Start: 2022-08-17 | End: 2022-08-17

## 2022-08-17 RX ORDER — SODIUM CHLORIDE 9 MG/ML
1000 INJECTION INTRAMUSCULAR; INTRAVENOUS; SUBCUTANEOUS ONCE
Refills: 0 | Status: COMPLETED | OUTPATIENT
Start: 2022-08-17 | End: 2022-08-17

## 2022-08-17 NOTE — ED STATDOCS - CLINICAL SUMMARY MEDICAL DECISION MAKING FREE TEXT BOX
36 y/o female with 1st trimester vaginal bleeding, concerning for miscarriage. Will obtain sonogram, reassess.

## 2022-08-17 NOTE — ED ADULT TRIAGE NOTE - BMI (KG/M2)
38.8 FMLA or Disability Forms were received and faxed to the Forms Completion Department today at [phone].  (Please include all appropriate authorization forms with your fax)    Did you have the patient complete (in full) and sign the \"Authorization for Disclosure of Health Information Forms Completion\" form?  NO ________faxed in  (If no, please give reason)    Have you communicated that the Forms Completion Process may take up to 14 days?  NO _________    If you have questions about this encounter, please contact the Forms Completion Dept. at [phone].

## 2022-08-17 NOTE — ED STATDOCS - NSFOLLOWUPINSTRUCTIONS_ED_ALL_ED_FT
return to ED or to your scheduled appointment with clinic in 48 hours for repeat hcg and ultrasound   take motrin for pain   take keflex for urinary tract infection     return if new or worsening symptoms     Urinary Tract Infection    A urinary tract infection (UTI) is an infection of any part of the urinary tract, which includes the kidneys, ureters, bladder, and urethra. Risk factors include ignoring your need to urinate, wiping back to front if female, being an uncircumcised male, and having diabetes or a weak immune system. Symptoms include frequent urination, pain or burning with urination, foul smelling urine, cloudy urine, pain in the lower abdomen, blood in the urine, and fever. If you were prescribed an antibiotic medicine, take it as told by your health care provider. Do not stop taking the antibiotic even if you start to feel better.    SEEK IMMEDIATE MEDICAL CARE IF YOU HAVE ANY OF THE FOLLOWING SYMPTOMS: severe back or abdominal pain, fever, inability to keep fluids or medicine down, dizziness/lightheadedness, or a change in mental status.      regrese al servicio de urgencias o a taveras teofilo programada con la clínica en 48 horas para repetir hcg y ultrasonido  pedro luis motrin para el dolor  pedro luis keflex para la infeccion del tracto urinario    regresar si los síntomas son nuevos o empeoran    Infección del tracto urinario    John infección del tracto urinario (ITU) es john infección de cualquier parte del tracto urinario, que incluye los riñones, los uréteres, la vejiga y la uretra. Los factores de riesgo incluyen ignorar taveras necesidad de orinar, limpiarse de atrás hacia adelante si es braden, ser un hombre no circuncidado y tener diabetes o un sistema inmunológico débil. Los síntomas incluyen micción frecuente, dolor o ardor al orinar, orina con mal olor, orina turbia, dolor en la parte inferior del abdomen, erasmo en la orina y fiebre. Si le recetaron un medicamento antibiótico, tómelo sonya se lo indicó taveras proveedor de atención médica. No deje de pedro luis el antibiótico aunque empiece a sentirse mejor.    BUSQUE ATENCIÓN MÉDICA INMEDIATA SI TIENE ALGUNO DE LOS SIGUIENTES SÍNTOMAS: dolor de espalda o abdominal intenso, fiebre, incapacidad para retener líquidos o medicamentos, mareos/aturdimiento o un cambio en el estado mental

## 2022-08-17 NOTE — ED STATDOCS - PATIENT PORTAL LINK FT
You can access the FollowMyHealth Patient Portal offered by [facility] by registering at the following website: [email or web address]. By joining [employer]’s FollowMyHealth portal, you will also be able to view your health information using other applications (apps) compatible with our system.

## 2022-08-17 NOTE — ED STATDOCS - ATTENDING APP SHARED VISIT CONTRIBUTION OF CARE
I, Rei Goins, performed the initial face to face bedside interview with this patient regarding history of present illness, review of symptoms and relevant past medical, social and family history.  I completed an independent physical examination.  I was the initial provider who evaluated this patient. I have signed out the follow up of any pending tests (i.e. labs, radiological studies) to the ACP.  I have communicated the patient’s plan of care and disposition with the ACP.  The history, relevant review of systems, past medical and surgical history, medical decision making, and physical examination was documented by the scribe in my presence and I attest to the accuracy of the documentation.

## 2022-08-17 NOTE — ED STATDOCS - PROGRESS NOTE DETAILS
incomplete miscarriage, discussed all results with pt, states she is not actively bleeding and is not experiencing abdominal cramping. she understands pregnancy is no longer viable. she has an appointment with the clinic on Friday. she has been advised to have repeat hcg and ultrasound. we discussed strict return precautions. - sasha east   olu [id number]

## 2022-08-17 NOTE — ED STATDOCS - INTERNATIONAL TRAVEL
[facility]  Dr. Tsosie Hodgkins Progress Note     Subjective:   Royce Moser is a(n) 56year old female. Current complaints: Patient presents with:  Fibromyalgia Syndrome: 3 month f/u.  Pt states 'has been feeling achy because of weather but today is a No

## 2022-08-17 NOTE — ED STATDOCS - CARDIAC, MLM
New patient today. Taking vitamin d. Will check level.    normal rate, regular rhythm, and no murmur.

## 2022-08-17 NOTE — ED STATDOCS - OBJECTIVE STATEMENT
36 y/o female  6 weeks pregnant presents with vaginal bleeding and associated cramping and abdominal discomfort. Pt states she passed a large clot PTA. LMP May 14, irregular at baseline. Denies cough, chest pain, COVID symptoms, fever. Pt has had 2 miscarriages in the past.

## 2022-08-17 NOTE — ED ADULT TRIAGE NOTE - CHIEF COMPLAINT QUOTE
Ambulatory reporting vaginal bleeding that started this evening with associated vaginal pain/pressure, . States that she also passed a very large clot PTA. History of 2 miscarriages, states that she believes that she is 6 weeks pregnant.

## 2022-08-18 VITALS
OXYGEN SATURATION: 98 % | TEMPERATURE: 98 F | DIASTOLIC BLOOD PRESSURE: 74 MMHG | HEART RATE: 90 BPM | RESPIRATION RATE: 18 BRPM | SYSTOLIC BLOOD PRESSURE: 119 MMHG

## 2022-08-18 LAB
ALBUMIN SERPL ELPH-MCNC: 4.4 G/DL — SIGNIFICANT CHANGE UP (ref 3.3–5.2)
ALP SERPL-CCNC: 159 U/L — HIGH (ref 40–120)
ALT FLD-CCNC: 174 U/L — HIGH
ANION GAP SERPL CALC-SCNC: 16 MMOL/L — SIGNIFICANT CHANGE UP (ref 5–17)
AST SERPL-CCNC: 220 U/L — HIGH
BASOPHILS # BLD AUTO: 0.02 K/UL — SIGNIFICANT CHANGE UP (ref 0–0.2)
BASOPHILS NFR BLD AUTO: 0.2 % — SIGNIFICANT CHANGE UP (ref 0–2)
BILIRUB SERPL-MCNC: 0.5 MG/DL — SIGNIFICANT CHANGE UP (ref 0.4–2)
BUN SERPL-MCNC: 6.3 MG/DL — LOW (ref 8–20)
CALCIUM SERPL-MCNC: 9.7 MG/DL — SIGNIFICANT CHANGE UP (ref 8.4–10.5)
CHLORIDE SERPL-SCNC: 94 MMOL/L — LOW (ref 98–107)
CO2 SERPL-SCNC: 22 MMOL/L — SIGNIFICANT CHANGE UP (ref 22–29)
CREAT SERPL-MCNC: 0.38 MG/DL — LOW (ref 0.5–1.3)
EGFR: 131 ML/MIN/1.73M2 — SIGNIFICANT CHANGE UP
EOSINOPHIL # BLD AUTO: 0.18 K/UL — SIGNIFICANT CHANGE UP (ref 0–0.5)
EOSINOPHIL NFR BLD AUTO: 1.5 % — SIGNIFICANT CHANGE UP (ref 0–6)
GLUCOSE SERPL-MCNC: 150 MG/DL — HIGH (ref 70–99)
HCG SERPL-ACNC: 4799 MIU/ML — HIGH
HCT VFR BLD CALC: 44.9 % — SIGNIFICANT CHANGE UP (ref 34.5–45)
HGB BLD-MCNC: 14.8 G/DL — SIGNIFICANT CHANGE UP (ref 11.5–15.5)
IMM GRANULOCYTES NFR BLD AUTO: 0.5 % — SIGNIFICANT CHANGE UP (ref 0–1.5)
LYMPHOCYTES # BLD AUTO: 1.11 K/UL — SIGNIFICANT CHANGE UP (ref 1–3.3)
LYMPHOCYTES # BLD AUTO: 9.3 % — LOW (ref 13–44)
MCHC RBC-ENTMCNC: 25.8 PG — LOW (ref 27–34)
MCHC RBC-ENTMCNC: 33 GM/DL — SIGNIFICANT CHANGE UP (ref 32–36)
MCV RBC AUTO: 78.4 FL — LOW (ref 80–100)
MONOCYTES # BLD AUTO: 0.57 K/UL — SIGNIFICANT CHANGE UP (ref 0–0.9)
MONOCYTES NFR BLD AUTO: 4.8 % — SIGNIFICANT CHANGE UP (ref 2–14)
NEUTROPHILS # BLD AUTO: 10.04 K/UL — HIGH (ref 1.8–7.4)
NEUTROPHILS NFR BLD AUTO: 83.7 % — HIGH (ref 43–77)
PLATELET # BLD AUTO: 368 K/UL — SIGNIFICANT CHANGE UP (ref 150–400)
POTASSIUM SERPL-MCNC: 3.8 MMOL/L — SIGNIFICANT CHANGE UP (ref 3.5–5.3)
POTASSIUM SERPL-SCNC: 3.8 MMOL/L — SIGNIFICANT CHANGE UP (ref 3.5–5.3)
PROT SERPL-MCNC: 8 G/DL — SIGNIFICANT CHANGE UP (ref 6.6–8.7)
RBC # BLD: 5.73 M/UL — HIGH (ref 3.8–5.2)
RBC # FLD: 14.7 % — HIGH (ref 10.3–14.5)
SODIUM SERPL-SCNC: 131 MMOL/L — LOW (ref 135–145)
WBC # BLD: 11.98 K/UL — HIGH (ref 3.8–10.5)
WBC # FLD AUTO: 11.98 K/UL — HIGH (ref 3.8–10.5)

## 2022-08-18 PROCEDURE — 86901 BLOOD TYPING SEROLOGIC RH(D): CPT

## 2022-08-18 PROCEDURE — 86850 RBC ANTIBODY SCREEN: CPT

## 2022-08-18 PROCEDURE — 76815 OB US LIMITED FETUS(S): CPT | Mod: 26

## 2022-08-18 PROCEDURE — 81001 URINALYSIS AUTO W/SCOPE: CPT

## 2022-08-18 PROCEDURE — 99284 EMERGENCY DEPT VISIT MOD MDM: CPT | Mod: 25

## 2022-08-18 PROCEDURE — 76801 OB US < 14 WKS SINGLE FETUS: CPT

## 2022-08-18 PROCEDURE — 76801 OB US < 14 WKS SINGLE FETUS: CPT | Mod: 26

## 2022-08-18 PROCEDURE — 85025 COMPLETE CBC W/AUTO DIFF WBC: CPT

## 2022-08-18 PROCEDURE — 86900 BLOOD TYPING SEROLOGIC ABO: CPT

## 2022-08-18 PROCEDURE — 76817 TRANSVAGINAL US OBSTETRIC: CPT | Mod: 26

## 2022-08-18 PROCEDURE — 36415 COLL VENOUS BLD VENIPUNCTURE: CPT

## 2022-08-18 PROCEDURE — 76817 TRANSVAGINAL US OBSTETRIC: CPT

## 2022-08-18 PROCEDURE — 84702 CHORIONIC GONADOTROPIN TEST: CPT

## 2022-08-18 PROCEDURE — 80053 COMPREHEN METABOLIC PANEL: CPT

## 2022-08-18 RX ORDER — CEPHALEXIN 500 MG
500 CAPSULE ORAL ONCE
Refills: 0 | Status: COMPLETED | OUTPATIENT
Start: 2022-08-18 | End: 2022-08-18

## 2022-08-18 RX ORDER — CEPHALEXIN 500 MG
1 CAPSULE ORAL
Qty: 28 | Refills: 0
Start: 2022-08-18 | End: 2022-08-24

## 2022-08-18 RX ADMIN — Medication 500 MILLIGRAM(S): at 03:45

## 2022-08-18 RX ADMIN — Medication 650 MILLIGRAM(S): at 02:09

## 2022-08-18 RX ADMIN — SODIUM CHLORIDE 1000 MILLILITER(S): 9 INJECTION INTRAMUSCULAR; INTRAVENOUS; SUBCUTANEOUS at 02:09

## 2022-10-25 NOTE — ED ADULT NURSE NOTE - NS ED NOTE ABUSE RESPONSE YN
[facility] Liaison Team Note     LOS:  9 Days    Patient goal(s) for today: Take medications as prescribed, communicate needs to staff appropriately, utilize coping skills as needed  [facility] Liaison team focus/goals: Assess needs, provide education and support, engage in brief therapy session when appropriate    Progress note: Clinician met w/ pt face to face w/ Khadijah weston present at bedside. Pt consented for Khadijah to be present during this meeting. Pt is currently laying in bed w/ blankets pulled up to his chin. When asked how pt is feeling today he responds \"feeling good\". Pt explains he was told he should be discharging home \"sometime today\" and is looking forward to this along w/ receiving his pain medications. Pt confirms he feels his pain is being properly managed. Pt denies SI/HI/VH/AH; no evidence of psychosis or delusional thoughts. Pt denies participating in out-pt psychiatry or therapy and denies needing any resources in that regard when offered; however, per chart review pt previously agreed to following-up w/ counseling upon discharge for support w/ managing his medical challenges. Pt denies any feelings of depression or anxiety. Pt shared that he uses deep breathing as a primary coping skill. Clinician provided education and encouragement regarding breathing techniques. Pt also shared that he enjoys fishing to relax and particularly [place]. Pt and Khadijah deny having any questions at this time and thanked clinician for the visit. Barriers to Discharge: Medical Clearance  Guns in the home: No    Outpatient provider(s):  Dr. Charlesetta Favre and [facility] for [facility]  Insurance info/prescription coverage:  [facility] MEDICAID/[place]    Diagnosis: Unspecified Mood Disorder    Plan:  Discharge home w/ Jose F weston and dialysis follow-up at [facility]  Follow up Psych Consult placed? No  Psychiatrist updated?  No      Participating treatment team members: Jake Pereira LMSW, Mother, Khadijah Yes

## 2023-09-12 NOTE — CHART NOTE - NSCHARTNOTEFT_GEN_A_CORE
Patient evaluated at bedside for clinical magnesium check. She denies visual disturbances including scotoma, headache and right upper quadrant pain. Also denies nausea/vomiting/epigastric pain/shortness of breath. Pain well controlled.      T(C): 37 (09-11-19 @ 22:00), Max: 37 (09-11-19 @ 22:00)  HR: 93 (09-12-19 @ 02:08) (92 - 114)  BP: 118/63 (09-12-19 @ 02:00) (104/52 - 203/83)  RR: 14 (09-12-19 @ 02:00) (14 - 20)  SpO2: 96% (09-12-19 @ 02:08) (75% - 100%)  Daily Height in cm: 167.64 (11 Sep 2019 05:55)    Daily Baby A: Weight (gm) Delivery: 3510 (11 Sep 2019 15:18)    09-11 @ 07:01  -  09-12 @ 02:12  --------------------------------------------------------  IN: 1600 mL / OUT: 2350 mL / NET: -750 mL      Gen: NAD, AAOx3  CV: RRR,   Pulm: CTAB, no increased respiratory effort  Abd: soft, nontender, no rebound or guarding, no epigastric tenderness, liver nonpalpable +BS, fundus palpated   : Degroot in place  Ext: +1 edema b/l, Reflexes 2+                          12.1   21.29 )-----------( 339      ( 11 Sep 2019 22:13 )             37.5     09-11    140  |  109<H>  |  7.0<L>  ----------------------------<  102  4.4   |  18.0<L>  |  0.55    Ca    9.0      11 Sep 2019 22:13    TPro  6.3<L>  /  Alb  2.8<L>  /  TBili  0.4  /  DBili  x   /  AST  48<H>  /  ALT  28  /  AlkPhos  272<H>  09-11 Detail Level: Detailed Detail Level: Zone Detail Level: Simple Sunscreen Recommendation Label Override: SPF 30+ mineral sunscreen reapplying every 2 hours while outdoors Sunscreen Recommendation Label Override: SPF 30+ mineral sunscreen Sunscreen Recommendation Label Override: SPF 30+ mineral sunscreens reapplying every 2 hours while outdoors

## 2024-07-03 NOTE — CHART NOTE - NSCHARTNOTESELECT_GEN_ALL_CORE
[employer] calling with auto triggered event for patient:    Today at 11:42AM ET  Sinus bradycardia at 33bpm    Faxed strips to [facility]     Routed to Dr. Zepeda and milagros to advise    Event Note

## 2025-02-17 ENCOUNTER — EMERGENCY (EMERGENCY)
Facility: HOSPITAL | Age: 40
LOS: 1 days | Discharge: HOME | End: 2025-02-17
Attending: EMERGENCY MEDICINE
Payer: MEDICAID

## 2025-02-17 VITALS
WEIGHT: 165.79 LBS | RESPIRATION RATE: 20 BRPM | HEIGHT: 60 IN | DIASTOLIC BLOOD PRESSURE: 73 MMHG | TEMPERATURE: 98 F | SYSTOLIC BLOOD PRESSURE: 123 MMHG | HEART RATE: 102 BPM | OXYGEN SATURATION: 99 %

## 2025-02-17 VITALS
TEMPERATURE: 98 F | HEART RATE: 75 BPM | DIASTOLIC BLOOD PRESSURE: 76 MMHG | OXYGEN SATURATION: 98 % | RESPIRATION RATE: 18 BRPM | SYSTOLIC BLOOD PRESSURE: 110 MMHG

## 2025-02-17 DIAGNOSIS — Z90.49 ACQUIRED ABSENCE OF OTHER SPECIFIED PARTS OF DIGESTIVE TRACT: Chronic | ICD-10-CM

## 2025-02-17 DIAGNOSIS — Z98.890 OTHER SPECIFIED POSTPROCEDURAL STATES: Chronic | ICD-10-CM

## 2025-02-17 DIAGNOSIS — O03.9 COMPLETE OR UNSPECIFIED SPONTANEOUS ABORTION WITHOUT COMPLICATION: Chronic | ICD-10-CM

## 2025-02-17 LAB
ALBUMIN SERPL ELPH-MCNC: 4.2 G/DL — SIGNIFICANT CHANGE UP (ref 3.3–5.2)
ALP SERPL-CCNC: 131 U/L — HIGH (ref 40–120)
ALT FLD-CCNC: 44 U/L — HIGH
ANION GAP SERPL CALC-SCNC: 12 MMOL/L — SIGNIFICANT CHANGE UP (ref 5–17)
APPEARANCE UR: CLEAR — SIGNIFICANT CHANGE UP
AST SERPL-CCNC: 43 U/L — HIGH
BACTERIA # UR AUTO: NEGATIVE /HPF — SIGNIFICANT CHANGE UP
BASOPHILS # BLD AUTO: 0.02 K/UL — SIGNIFICANT CHANGE UP (ref 0–0.2)
BASOPHILS # BLD MANUAL: 0 K/UL — SIGNIFICANT CHANGE UP (ref 0–0.2)
BASOPHILS NFR BLD AUTO: 0.2 % — SIGNIFICANT CHANGE UP (ref 0–2)
BASOPHILS NFR BLD MANUAL: 0 % — SIGNIFICANT CHANGE UP (ref 0–2)
BILIRUB SERPL-MCNC: <0.2 MG/DL — LOW (ref 0.4–2)
BILIRUB UR-MCNC: NEGATIVE — SIGNIFICANT CHANGE UP
BUN SERPL-MCNC: 6.7 MG/DL — LOW (ref 8–20)
CALCIUM SERPL-MCNC: 8.9 MG/DL — SIGNIFICANT CHANGE UP (ref 8.4–10.5)
CAST: 4 /LPF — SIGNIFICANT CHANGE UP (ref 0–4)
CHLORIDE SERPL-SCNC: 99 MMOL/L — SIGNIFICANT CHANGE UP (ref 96–108)
CO2 SERPL-SCNC: 25 MMOL/L — SIGNIFICANT CHANGE UP (ref 22–29)
COLOR SPEC: YELLOW — SIGNIFICANT CHANGE UP
CREAT SERPL-MCNC: 0.49 MG/DL — LOW (ref 0.5–1.3)
DIFF PNL FLD: NEGATIVE — SIGNIFICANT CHANGE UP
EGFR: 121 ML/MIN/1.73M2 — SIGNIFICANT CHANGE UP
ELLIPTOCYTES BLD QL SMEAR: SLIGHT — SIGNIFICANT CHANGE UP
EOSINOPHIL # BLD AUTO: 0.12 K/UL — SIGNIFICANT CHANGE UP (ref 0–0.5)
EOSINOPHIL # BLD MANUAL: 0.1 K/UL — SIGNIFICANT CHANGE UP (ref 0–0.5)
EOSINOPHIL NFR BLD AUTO: 1.1 % — SIGNIFICANT CHANGE UP (ref 0–6)
EOSINOPHIL NFR BLD MANUAL: 0.9 % — SIGNIFICANT CHANGE UP (ref 0–6)
GIANT PLATELETS BLD QL SMEAR: PRESENT
GLUCOSE SERPL-MCNC: 253 MG/DL — HIGH (ref 70–99)
GLUCOSE UR QL: >=1000 MG/DL
HCG SERPL-ACNC: <4 MIU/ML — SIGNIFICANT CHANGE UP
HCT VFR BLD CALC: 36.6 % — SIGNIFICANT CHANGE UP (ref 34.5–45)
HGB BLD-MCNC: 11.6 G/DL — SIGNIFICANT CHANGE UP (ref 11.5–15.5)
IMM GRANULOCYTES # BLD AUTO: 0.05 K/UL — SIGNIFICANT CHANGE UP (ref 0–0.07)
IMM GRANULOCYTES NFR BLD AUTO: 0.5 % — SIGNIFICANT CHANGE UP (ref 0–0.9)
KETONES UR-MCNC: NEGATIVE MG/DL — SIGNIFICANT CHANGE UP
LEUKOCYTE ESTERASE UR-ACNC: ABNORMAL
LYMPHOCYTES # BLD AUTO: 2.06 K/UL — SIGNIFICANT CHANGE UP (ref 1–3.3)
LYMPHOCYTES # BLD MANUAL: 1.7 K/UL — SIGNIFICANT CHANGE UP (ref 1–3.3)
LYMPHOCYTES NFR BLD AUTO: 19.1 % — SIGNIFICANT CHANGE UP (ref 13–44)
LYMPHOCYTES NFR BLD MANUAL: 15.7 % — SIGNIFICANT CHANGE UP (ref 13–44)
MCHC RBC-ENTMCNC: 23.6 PG — LOW (ref 27–34)
MCHC RBC-ENTMCNC: 31.7 G/DL — LOW (ref 32–36)
MCV RBC AUTO: 74.4 FL — LOW (ref 80–100)
MICROCYTES BLD QL: SLIGHT — SIGNIFICANT CHANGE UP
MONOCYTES # BLD AUTO: 0.64 K/UL — SIGNIFICANT CHANGE UP (ref 0–0.9)
MONOCYTES # BLD MANUAL: 0.1 K/UL — SIGNIFICANT CHANGE UP (ref 0–0.9)
MONOCYTES NFR BLD AUTO: 5.9 % — SIGNIFICANT CHANGE UP (ref 2–14)
MONOCYTES NFR BLD MANUAL: 0.9 % — LOW (ref 2–14)
NEUTROPHILS # BLD AUTO: 7.92 K/UL — HIGH (ref 1.8–7.4)
NEUTROPHILS # BLD MANUAL: 8.92 K/UL — HIGH (ref 1.8–7.4)
NEUTROPHILS NFR BLD AUTO: 73.2 % — SIGNIFICANT CHANGE UP (ref 43–77)
NEUTROPHILS NFR BLD MANUAL: 82.5 % — HIGH (ref 43–77)
NITRITE UR-MCNC: NEGATIVE — SIGNIFICANT CHANGE UP
NRBC # BLD AUTO: 0 K/UL — SIGNIFICANT CHANGE UP (ref 0–0)
NRBC # FLD: 0 K/UL — SIGNIFICANT CHANGE UP (ref 0–0)
NRBC BLD AUTO-RTO: 0 /100 WBCS — SIGNIFICANT CHANGE UP (ref 0–0)
PH UR: 6 — SIGNIFICANT CHANGE UP (ref 5–8)
PLAT MORPH BLD: NORMAL — SIGNIFICANT CHANGE UP
PLATELET # BLD AUTO: 411 K/UL — HIGH (ref 150–400)
PLATELET COUNT - ESTIMATE: NORMAL — SIGNIFICANT CHANGE UP
PMV BLD: 10 FL — SIGNIFICANT CHANGE UP (ref 7–13)
POLYCHROMASIA BLD QL SMEAR: SLIGHT — SIGNIFICANT CHANGE UP
POTASSIUM SERPL-MCNC: 3.9 MMOL/L — SIGNIFICANT CHANGE UP (ref 3.5–5.3)
POTASSIUM SERPL-SCNC: 3.9 MMOL/L — SIGNIFICANT CHANGE UP (ref 3.5–5.3)
PROT SERPL-MCNC: 7.3 G/DL — SIGNIFICANT CHANGE UP (ref 6.6–8.7)
PROT UR-MCNC: NEGATIVE MG/DL — SIGNIFICANT CHANGE UP
RBC # BLD: 4.92 M/UL — SIGNIFICANT CHANGE UP (ref 3.8–5.2)
RBC # FLD: 14.4 % — SIGNIFICANT CHANGE UP (ref 10.3–14.5)
RBC BLD AUTO: ABNORMAL
RBC CASTS # UR COMP ASSIST: 0 /HPF — SIGNIFICANT CHANGE UP (ref 0–4)
SODIUM SERPL-SCNC: 135 MMOL/L — SIGNIFICANT CHANGE UP (ref 135–145)
SP GR SPEC: 1.02 — SIGNIFICANT CHANGE UP (ref 1–1.03)
SQUAMOUS # UR AUTO: 3 /HPF — SIGNIFICANT CHANGE UP (ref 0–5)
UROBILINOGEN FLD QL: 0.2 MG/DL — SIGNIFICANT CHANGE UP (ref 0.2–1)
WBC # BLD: 10.81 K/UL — HIGH (ref 3.8–10.5)
WBC # FLD AUTO: 10.81 K/UL — HIGH (ref 3.8–10.5)
WBC UR QL: 65 /HPF — HIGH (ref 0–5)

## 2025-02-17 PROCEDURE — 85025 COMPLETE CBC W/AUTO DIFF WBC: CPT

## 2025-02-17 PROCEDURE — 82962 GLUCOSE BLOOD TEST: CPT

## 2025-02-17 PROCEDURE — 84702 CHORIONIC GONADOTROPIN TEST: CPT

## 2025-02-17 PROCEDURE — 36415 COLL VENOUS BLD VENIPUNCTURE: CPT

## 2025-02-17 PROCEDURE — T1013: CPT

## 2025-02-17 PROCEDURE — 81001 URINALYSIS AUTO W/SCOPE: CPT

## 2025-02-17 PROCEDURE — 96375 TX/PRO/DX INJ NEW DRUG ADDON: CPT

## 2025-02-17 PROCEDURE — 96374 THER/PROPH/DIAG INJ IV PUSH: CPT

## 2025-02-17 PROCEDURE — 99284 EMERGENCY DEPT VISIT MOD MDM: CPT | Mod: 25

## 2025-02-17 PROCEDURE — 80053 COMPREHEN METABOLIC PANEL: CPT

## 2025-02-17 PROCEDURE — 99284 EMERGENCY DEPT VISIT MOD MDM: CPT

## 2025-02-17 PROCEDURE — 87086 URINE CULTURE/COLONY COUNT: CPT

## 2025-02-17 RX ORDER — ACETAMINOPHEN 160 MG/5ML
1000 SUSPENSION ORAL ONCE
Refills: 0 | Status: COMPLETED | OUTPATIENT
Start: 2025-02-17 | End: 2025-02-17

## 2025-02-17 RX ORDER — CEFTRIAXONE 250 MG/1
1000 INJECTION, POWDER, FOR SOLUTION INTRAMUSCULAR; INTRAVENOUS ONCE
Refills: 0 | Status: COMPLETED | OUTPATIENT
Start: 2025-02-17 | End: 2025-02-17

## 2025-02-17 RX ORDER — CEFTRIAXONE 250 MG/1
1000 INJECTION, POWDER, FOR SOLUTION INTRAMUSCULAR; INTRAVENOUS ONCE
Refills: 0 | Status: DISCONTINUED | OUTPATIENT
Start: 2025-02-17 | End: 2025-02-17

## 2025-02-17 RX ORDER — CEFPODOXIME PROXETIL 200 MG
1 TABLET ORAL
Qty: 28 | Refills: 0
Start: 2025-02-17 | End: 2025-03-02

## 2025-02-17 RX ORDER — BACTERIOSTATIC SODIUM CHLORIDE 0.9 %
1000 VIAL (ML) INJECTION ONCE
Refills: 0 | Status: COMPLETED | OUTPATIENT
Start: 2025-02-17 | End: 2025-02-17

## 2025-02-17 RX ADMIN — Medication 1000 MILLILITER(S): at 14:08

## 2025-02-17 RX ADMIN — ACETAMINOPHEN 400 MILLIGRAM(S): 160 SUSPENSION ORAL at 14:08

## 2025-02-17 RX ADMIN — CEFTRIAXONE 1000 MILLIGRAM(S): 250 INJECTION, POWDER, FOR SOLUTION INTRAMUSCULAR; INTRAVENOUS at 16:37

## 2025-02-17 NOTE — ED ADULT TRIAGE NOTE - BSA (M2)
I acted within this role throughout the entirety of the procedure performed by the primary surgeon
1.72

## 2025-02-17 NOTE — ED PROVIDER NOTE - ATTENDING CONTRIBUTION TO CARE
40 year old female  presents to the ED with suprapubic pain. States 2 weeks ago developed dysuria associated with  fevers, right flank pain, suprapubic pain.  Denies any hematuria, vaginal bleeding, discharge. Sexually active with 1 partner. Does not use protection. LMP February 2nd. No history of kidney stones. Denies n/v/cp/sob/palpitations/cough/rash/headache/dizziness/abd.pain/d/c/hematuria.     Head: atraumatic, normacephalic  Face: atraumatic, no crepitus no orbiral/maxillary/mandibular ttp  throat: uvula midline no exudates  eyes: perrla eomi  heart: rrr s1s2  lungs: ctab  abd: soft, suprapubic discomfort nd +bs no rebound/guarding right cva ttp  skin: warm  LE: no swelling, no calf ttp  back: no midline cervical/thoracic/lumbar ttp    uti/peylo check labs ua reassess

## 2025-02-17 NOTE — ED PROVIDER NOTE - PHYSICAL EXAMINATION
Gen: NAD, AOx3  Head: NCAT  HEENT: EOMI, oral mucosa moist, normal conjunctiva, neck supple  Lung: CTAB, no respiratory distress  CV: rrr, no murmur, Normal perfusion  Abd: lower suprapubic tenderness without distension, rebound or rigidity  MSK: right CVA tenderness  Neuro: No focal neurologic deficits  Skin: No rash   Psych: normal affect Gen: NAD, AOx3  Head: NCAT  HEENT: EOMI, oral mucosa moist, normal conjunctiva, neck supple  Lung: CTAB, no respiratory distress  CV: rrr, no murmur, Normal perfusion  Abd: lower suprapubic tenderness without distension, NO  rebound or rigidity  MSK: right CVA tenderness  Neuro: No focal neurologic deficits  Skin: No rash   Psych: normal affect

## 2025-02-17 NOTE — ED PROVIDER NOTE - PATIENT PORTAL LINK FT
You can access the FollowMyHealth Patient Portal offered by [facility] by registering at the following website: [email or web address]. By joining [employer]’s FollowMyHealth portal, you will also be able to view your health information using other applications (apps) compatible with our system.

## 2025-02-17 NOTE — ED PROVIDER NOTE - CLINICAL SUMMARY MEDICAL DECISION MAKING FREE TEXT BOX
On arrival afebrile, mildly tachycardic otherwise HD Stable. Well appearing. Suspected UTI vs pyelonephritis. Will obtain HCG, if elevated follow up with US pelvic imaging. Given IVF, ofirmev for sx management.

## 2025-02-17 NOTE — ED PROVIDER NOTE - OBJECTIVE STATEMENT
40 year old female  presents to the ED with pelvic pain. States 2 weeks ago developed dysuria associated with tactile fevers, right flank pain, lower pelvic pain. Denies any hematuria, vaginal bleeding, discharge. Sexually active with 1 partner. Does not use protection. LMP . No history of kidney stones. Did not take any meds PTA.

## 2025-02-18 LAB
CULTURE RESULTS: SIGNIFICANT CHANGE UP
SPECIMEN SOURCE: SIGNIFICANT CHANGE UP

## 2025-03-11 ENCOUNTER — OUTPATIENT (OUTPATIENT)
Dept: OUTPATIENT SERVICES | Facility: HOSPITAL | Age: 40
LOS: 1 days | End: 2025-03-11

## 2025-03-11 ENCOUNTER — APPOINTMENT (OUTPATIENT)
Dept: ULTRASOUND IMAGING | Facility: CLINIC | Age: 40
End: 2025-03-11

## 2025-03-11 DIAGNOSIS — O03.9 COMPLETE OR UNSPECIFIED SPONTANEOUS ABORTION WITHOUT COMPLICATION: Chronic | ICD-10-CM

## 2025-03-11 DIAGNOSIS — Z00.8 ENCOUNTER FOR OTHER GENERAL EXAMINATION: ICD-10-CM

## 2025-03-11 DIAGNOSIS — Z98.890 OTHER SPECIFIED POSTPROCEDURAL STATES: Chronic | ICD-10-CM

## 2025-03-11 DIAGNOSIS — Z90.49 ACQUIRED ABSENCE OF OTHER SPECIFIED PARTS OF DIGESTIVE TRACT: Chronic | ICD-10-CM

## 2025-03-11 PROCEDURE — 76981 USE PARENCHYMA: CPT | Mod: 26

## 2025-03-15 ENCOUNTER — EMERGENCY (EMERGENCY)
Facility: HOSPITAL | Age: 40
LOS: 1 days | Discharge: HOME | End: 2025-03-15
Attending: EMERGENCY MEDICINE
Payer: MEDICAID

## 2025-03-15 VITALS
DIASTOLIC BLOOD PRESSURE: 85 MMHG | OXYGEN SATURATION: 99 % | RESPIRATION RATE: 17 BRPM | SYSTOLIC BLOOD PRESSURE: 130 MMHG | WEIGHT: 240.3 LBS | TEMPERATURE: 98 F | HEART RATE: 99 BPM | HEIGHT: 60 IN

## 2025-03-15 DIAGNOSIS — Z90.49 ACQUIRED ABSENCE OF OTHER SPECIFIED PARTS OF DIGESTIVE TRACT: Chronic | ICD-10-CM

## 2025-03-15 DIAGNOSIS — Z98.890 OTHER SPECIFIED POSTPROCEDURAL STATES: Chronic | ICD-10-CM

## 2025-03-15 DIAGNOSIS — O03.9 COMPLETE OR UNSPECIFIED SPONTANEOUS ABORTION WITHOUT COMPLICATION: Chronic | ICD-10-CM

## 2025-03-15 LAB
ALBUMIN SERPL ELPH-MCNC: 4 G/DL — SIGNIFICANT CHANGE UP (ref 3.3–5.2)
ALP SERPL-CCNC: 122 U/L — HIGH (ref 40–120)
ALT FLD-CCNC: 44 U/L — HIGH
ANION GAP SERPL CALC-SCNC: 15 MMOL/L — SIGNIFICANT CHANGE UP (ref 5–17)
APPEARANCE UR: CLEAR — SIGNIFICANT CHANGE UP
AST SERPL-CCNC: 46 U/L — HIGH
BILIRUB SERPL-MCNC: 0.2 MG/DL — LOW (ref 0.4–2)
BILIRUB UR-MCNC: NEGATIVE — SIGNIFICANT CHANGE UP
BUN SERPL-MCNC: 9.3 MG/DL — SIGNIFICANT CHANGE UP (ref 8–20)
CALCIUM SERPL-MCNC: 8.9 MG/DL — SIGNIFICANT CHANGE UP (ref 8.4–10.5)
CHLORIDE SERPL-SCNC: 102 MMOL/L — SIGNIFICANT CHANGE UP (ref 96–108)
CK SERPL-CCNC: 67 U/L — SIGNIFICANT CHANGE UP (ref 25–170)
CO2 SERPL-SCNC: 21 MMOL/L — LOW (ref 22–29)
COLOR SPEC: YELLOW — SIGNIFICANT CHANGE UP
CREAT SERPL-MCNC: 0.37 MG/DL — LOW (ref 0.5–1.3)
DIFF PNL FLD: NEGATIVE — SIGNIFICANT CHANGE UP
EGFR: 129 ML/MIN/1.73M2 — SIGNIFICANT CHANGE UP
EGFR: 129 ML/MIN/1.73M2 — SIGNIFICANT CHANGE UP
GLUCOSE SERPL-MCNC: 257 MG/DL — HIGH (ref 70–99)
GLUCOSE UR QL: >=1000 MG/DL
HCG SERPL-ACNC: <4 MIU/ML — SIGNIFICANT CHANGE UP
HCT VFR BLD CALC: 37.6 % — SIGNIFICANT CHANGE UP (ref 34.5–45)
HETEROPH AB TITR SER AGGL: NEGATIVE — SIGNIFICANT CHANGE UP
HGB BLD-MCNC: 11.9 G/DL — SIGNIFICANT CHANGE UP (ref 11.5–15.5)
KETONES UR-MCNC: ABNORMAL MG/DL
LEUKOCYTE ESTERASE UR-ACNC: NEGATIVE — SIGNIFICANT CHANGE UP
MCHC RBC-ENTMCNC: 23.7 PG — LOW (ref 27–34)
MCHC RBC-ENTMCNC: 31.6 G/DL — LOW (ref 32–36)
MCV RBC AUTO: 74.8 FL — LOW (ref 80–100)
NITRITE UR-MCNC: NEGATIVE — SIGNIFICANT CHANGE UP
NRBC # BLD AUTO: 0 K/UL — SIGNIFICANT CHANGE UP (ref 0–0)
NRBC # FLD: 0 K/UL — SIGNIFICANT CHANGE UP (ref 0–0)
NRBC BLD AUTO-RTO: 0 /100 WBCS — SIGNIFICANT CHANGE UP (ref 0–0)
PH UR: 6 — SIGNIFICANT CHANGE UP (ref 5–8)
PLATELET # BLD AUTO: 391 K/UL — SIGNIFICANT CHANGE UP (ref 150–400)
PMV BLD: 9.7 FL — SIGNIFICANT CHANGE UP (ref 7–13)
POTASSIUM SERPL-MCNC: 4.1 MMOL/L — SIGNIFICANT CHANGE UP (ref 3.5–5.3)
POTASSIUM SERPL-SCNC: 4.1 MMOL/L — SIGNIFICANT CHANGE UP (ref 3.5–5.3)
PROT SERPL-MCNC: 7.1 G/DL — SIGNIFICANT CHANGE UP (ref 6.6–8.7)
PROT UR-MCNC: NEGATIVE MG/DL — SIGNIFICANT CHANGE UP
RBC # BLD: 5.03 M/UL — SIGNIFICANT CHANGE UP (ref 3.8–5.2)
RBC # FLD: 16.6 % — HIGH (ref 10.3–14.5)
SODIUM SERPL-SCNC: 138 MMOL/L — SIGNIFICANT CHANGE UP (ref 135–145)
SP GR SPEC: 1.02 — SIGNIFICANT CHANGE UP (ref 1–1.03)
UROBILINOGEN FLD QL: 0.2 MG/DL — SIGNIFICANT CHANGE UP (ref 0.2–1)
WBC # BLD: 9.15 K/UL — SIGNIFICANT CHANGE UP (ref 3.8–10.5)
WBC # FLD AUTO: 9.15 K/UL — SIGNIFICANT CHANGE UP (ref 3.8–10.5)

## 2025-03-15 PROCEDURE — 87591 N.GONORRHOEAE DNA AMP PROB: CPT

## 2025-03-15 PROCEDURE — 87491 CHLMYD TRACH DNA AMP PROBE: CPT

## 2025-03-15 PROCEDURE — 85027 COMPLETE CBC AUTOMATED: CPT

## 2025-03-15 PROCEDURE — 86308 HETEROPHILE ANTIBODY SCREEN: CPT

## 2025-03-15 PROCEDURE — 81003 URINALYSIS AUTO W/O SCOPE: CPT

## 2025-03-15 PROCEDURE — 99284 EMERGENCY DEPT VISIT MOD MDM: CPT

## 2025-03-15 PROCEDURE — 36415 COLL VENOUS BLD VENIPUNCTURE: CPT

## 2025-03-15 PROCEDURE — 99283 EMERGENCY DEPT VISIT LOW MDM: CPT

## 2025-03-15 PROCEDURE — 82550 ASSAY OF CK (CPK): CPT

## 2025-03-15 PROCEDURE — 84702 CHORIONIC GONADOTROPIN TEST: CPT

## 2025-03-15 PROCEDURE — 80053 COMPREHEN METABOLIC PANEL: CPT

## 2025-03-15 RX ORDER — IBUPROFEN 200 MG
600 TABLET ORAL ONCE
Refills: 0 | Status: COMPLETED | OUTPATIENT
Start: 2025-03-15 | End: 2025-03-15

## 2025-03-15 RX ORDER — METHOCARBAMOL 500 MG/1
1500 TABLET, FILM COATED ORAL ONCE
Refills: 0 | Status: COMPLETED | OUTPATIENT
Start: 2025-03-15 | End: 2025-03-15

## 2025-03-15 RX ADMIN — METHOCARBAMOL 1500 MILLIGRAM(S): 500 TABLET, FILM COATED ORAL at 11:17

## 2025-03-15 RX ADMIN — Medication 600 MILLIGRAM(S): at 11:16

## 2025-03-15 NOTE — ED PROVIDER NOTE - NS ED MD DISPO DISCHARGE CCDA
[facility]  closed case after no contact    Social Work note:       did not make contact with patient, and today was the 4th attempt. Patient's  reports that she is hospitalized again. SW left her daughter Prince Mirian [place], letting her know of SW's attempts, and case closure. SW suggested calling their local [facility] and [address] [place] [address] and/or Adult [facility], for further assistance. [facility] referral was for placement, which family can pursue with the help of the [facility]. QUINTON will remove self from team.     Since SW received case on 6/12/23, patient has been hospitalized 7 times, and stayed in a [facility] briefly, at one point as well. She's been in multiple medical systems, as well.      's next contact: Upon new [facility] referral Patient/Caregiver provided printed discharge information.

## 2025-03-15 NOTE — ED PROVIDER NOTE - NSFOLLOWUPINSTRUCTIONS_ED_ALL_ED_FT
Dolor abdominal    Muchas cosas pueden causar dolor abdominal. En muchos casos, el dolor abdominal no es causado por john enfermedad y mejora sin tratamiento. Taveras médico le realizará un examen físico para determinar si existe john causa grave de taveras dolor; los análisis de erasmo y las imágenes pueden ayudar a determinar la causa. Sin embargo, en muchos casos, no se puede encontrar la causa y podría necesitar más pruebas sonya paciente ambulatorio. Vigile taveras dolor abdominal para detectar cualquier cambio.    BUSQUE ATENCIÓN MÉDICA INMEDIATA SI PRESENTA ALGUNO DE LOS SIGUIENTES SÍNTOMAS: empeoramiento del dolor abdominal, vómitos incontrolables, diarrea profusa, incapacidad para defecar o expulsar gases, heces negras o con erasmo, fiebre acompañada de dolor en el pecho o de espalda, o desmayos. Estos síntomas pueden indicar un problema grave que constituye john emergencia. No espere a dania si los síntomas desaparecen. Busque ayuda médica de inmediato. Llame al 911 y no conduzca hasta el hospital. Acudió a urgencias por jabari corporales.  Se le realizaron análisis de erasmo y orina, los cuales resultaron negativos para john infección urinaria o john rotura muscular.  Manténgase hidratado en casa y tome Tylenol o Motrin según sea necesario para el dolor.  Consulte con taveras médico de cabecera dentro de la próxima semana.  Regrese a urgencias si presenta síntomas nuevos o que empeoran.

## 2025-03-15 NOTE — ED ADULT NURSE NOTE - NS ED NURSE RECORD ANOTHER HT AND WT
Yes 69 yo female, PMHx of HTN, COPD, OA, presents with left elbow pain x1 week. Patient is an employee in the hospital; states she may have bumped the elbow but is unsure. Denies pain with ROM of elbow however endorses pain when she puts her elbows down on table. Patient noticed swelling to the dorsal aspect of the proximal forearm, distal to the elbow- this is where her tenderness is. Denies fevers.     Well appearing. Appears to have thickening of the skin of the proximal dorsal forearm, no point tenderness of bone. No overlying erythema or blistering. Neurovascularly intact LUE.     Plan for xray to r/o fracture, however instructed that she may need biopsy of the skin/subQ tissue should inflammation not resolve.   Follow up with dermatology/PCP. Return precautions discussed.

## 2025-03-15 NOTE — ED PROVIDER NOTE - ATTENDING CONTRIBUTION TO CARE
40F hx NIDDM on Metformin presents with 1-2 weeks of generalized body aches, lower abdominal pain. On exam well appearing, abdomen soft nontender, tenderness of paraspinal muscles. DDx includes but not limited to UTI, low grade viral syndrome, rhabdo. Bloodwork, urine, sx control, likely dc with outpatient followup    I, Rei Goins, performed the initial face to face bedside interview with this patient regarding history of present illness, review of symptoms and relevant past medical, social and family history.  I completed an independent physical examination.  I was the initial provider who evaluated this patient. I have signed out the follow up of any pending tests (i.e. labs, radiological studies) to the resident.  I have communicated the patient’s plan of care and disposition with the resident

## 2025-03-15 NOTE — ED ADULT NURSE NOTE - OBJECTIVE STATEMENT
pt alert and oriented x4 comes in c/o headache, neck pain, shoulder pain, back pain, UTI 1 month ago. hx of low blood counts

## 2025-03-15 NOTE — ED PROVIDER NOTE - OBJECTIVE STATEMENT
40F hx NIDDM on Metformin presents with 1-2 weeks of generalized body aches, lower abdominal pain. Pt states she was in ED last month with a UTI. Today however denies pain with urination or blood in urine. Denies fever, N/V/D, rashes, CP, SOB, cough. Has not yet taken anything for pain today

## 2025-03-15 NOTE — ED ADULT TRIAGE NOTE - CHIEF COMPLAINT QUOTE
multiple medical complaints; c/o intermittent headache, RLQ pain radiating to right side, + urinary symptoms x 1 week, intermittent neck pain x  1 month

## 2025-03-15 NOTE — ED PROVIDER NOTE - CLINICAL SUMMARY MEDICAL DECISION MAKING FREE TEXT BOX
40F hx NIDDM on Metformin presents with 1-2 weeks of generalized body aches, lower abdominal pain. On exam well appearing, abdomen soft nontender, tenderness of paraspinal muscles. DDx includes but not limited to UTI, low grade viral syndrome, rhabdo. Bloodwork, urine, sx control, likely dc with outpatient followup

## 2025-03-15 NOTE — ED ADULT NURSE NOTE - NSFALLUNIVINTERV_ED_ALL_ED
Bed/Stretcher in lowest position, wheels locked, appropriate side rails in place/Call bell, personal items and telephone in reach/Instruct patient to call for assistance before getting out of bed/chair/stretcher/Non-slip footwear applied when patient is off stretcher/[place] to call system/Physically safe environment - no spills, clutter or unnecessary equipment/Purposeful proactive rounding/Room/bathroom lighting operational, light cord in reach

## 2025-03-15 NOTE — ED PROVIDER NOTE - PHYSICAL EXAMINATION
General: Awake, alert, lying in bed in NAD. Obese  HEENT: Normocephalic, atraumatic. No scleral icterus or conjunctival injection. EOMI. Moist mucous membranes. Oropharynx clear.   Neck:. Soft and supple.  Cardiac: RRR, Peripheral pulses 2+ and symmetric. No LE edema.  Resp: Lungs CTAB. No accessory muscle use  Abd: Soft, non-tender, non-distended. No guarding, rebound, or rigidity.  Back: Spine midline and non-tender. Paraspinal tenderness  Skin: No rashes, abrasions, or lacerations.  Neuro: AO x 4. Moves all extremities symmetrically. Motor strength and sensation grossly intact.  Psych: Appropriate mood and affect

## 2025-03-15 NOTE — ED PROVIDER NOTE - PROGRESS NOTE DETAILS
Pt feels well. Discussed reassuring yet unrevealing workup results and that some tests are pending for which she will receive a call if positive. Return precautions discussed, will send rx for pain medications and advised PMD followup within the next week. Sandip Huffman MD

## 2025-03-15 NOTE — ED PROVIDER NOTE - PATIENT PORTAL LINK FT
You can access the FollowMyHealth Patient Portal offered by [facility] by registering at the following website: [email or web address]. By joining [employer]’s FollowMyHealth portal, you will also be able to view your health information using other applications (apps) compatible with our system.

## 2025-03-16 LAB
C TRACH RRNA SPEC QL NAA+PROBE: SIGNIFICANT CHANGE UP
N GONORRHOEA RRNA SPEC QL NAA+PROBE: SIGNIFICANT CHANGE UP
SPECIMEN SOURCE: SIGNIFICANT CHANGE UP
SPECIMEN SOURCE: SIGNIFICANT CHANGE UP

## 2025-04-29 NOTE — ED PROVIDER NOTE - NSTIMEPROVIDERCAREINITIATE_GEN_ER
[facility] - Periop Services 2nd Floor  Discharge Note  Short Stay    Procedure(s) (LRB):  PHACOEMULSIFICATION, CATARACT, WITH IOL INSERTION     //////left eye (Left)      OUTCOME: Patient tolerated treatment/procedure well without complication and is now ready for discharge.    DISPOSITION: Home or Self Care    FINAL DIAGNOSIS:  Pseudophakia Left eye     FOLLOWUP: In clinic    DISCHARGE INSTRUCTIONS:  Review patient's discharge instructions in chart     TIME SPENT ON DISCHARGE: 5 minutes  
15-Apr-2022 03:48

## 2025-05-15 ENCOUNTER — OUTPATIENT (OUTPATIENT)
Dept: OUTPATIENT SERVICES | Facility: HOSPITAL | Age: 40
LOS: 1 days | End: 2025-05-15
Payer: SELF-PAY

## 2025-05-15 ENCOUNTER — APPOINTMENT (OUTPATIENT)
Dept: MRI IMAGING | Facility: CLINIC | Age: 40
End: 2025-05-15

## 2025-05-15 DIAGNOSIS — O03.9 COMPLETE OR UNSPECIFIED SPONTANEOUS ABORTION WITHOUT COMPLICATION: Chronic | ICD-10-CM

## 2025-05-15 DIAGNOSIS — Z90.49 ACQUIRED ABSENCE OF OTHER SPECIFIED PARTS OF DIGESTIVE TRACT: Chronic | ICD-10-CM

## 2025-05-15 DIAGNOSIS — Z98.890 OTHER SPECIFIED POSTPROCEDURAL STATES: Chronic | ICD-10-CM

## 2025-05-15 DIAGNOSIS — Z00.8 ENCOUNTER FOR OTHER GENERAL EXAMINATION: ICD-10-CM

## 2025-05-15 PROCEDURE — 74183 MRI ABD W/O CNTR FLWD CNTR: CPT | Mod: 26

## 2025-05-15 PROCEDURE — 76391 MR ELASTOGRAPHY: CPT

## 2025-05-15 PROCEDURE — 76391 MR ELASTOGRAPHY: CPT | Mod: 26

## 2025-05-15 PROCEDURE — 74183 MRI ABD W/O CNTR FLWD CNTR: CPT

## 2025-05-15 PROCEDURE — A9585: CPT
